# Patient Record
Sex: FEMALE | Race: WHITE | NOT HISPANIC OR LATINO | Employment: OTHER | URBAN - METROPOLITAN AREA
[De-identification: names, ages, dates, MRNs, and addresses within clinical notes are randomized per-mention and may not be internally consistent; named-entity substitution may affect disease eponyms.]

---

## 2017-06-08 ENCOUNTER — OFFICE VISIT (OUTPATIENT)
Dept: ENDOCRINOLOGY | Age: 47
End: 2017-06-08

## 2017-06-08 VITALS
SYSTOLIC BLOOD PRESSURE: 122 MMHG | BODY MASS INDEX: 33.49 KG/M2 | HEIGHT: 68 IN | WEIGHT: 221 LBS | DIASTOLIC BLOOD PRESSURE: 84 MMHG

## 2017-06-08 DIAGNOSIS — R53.82 CHRONIC FATIGUE: ICD-10-CM

## 2017-06-08 DIAGNOSIS — L68.0 HIRSUTISM: ICD-10-CM

## 2017-06-08 DIAGNOSIS — IMO0002 UNCONTROLLED TYPE 2 DIABETES MELLITUS WITH COMPLICATION, WITHOUT LONG-TERM CURRENT USE OF INSULIN: Primary | ICD-10-CM

## 2017-06-08 PROBLEM — G89.29 CHRONIC BACK PAIN: Status: ACTIVE | Noted: 2017-06-08

## 2017-06-08 PROBLEM — M54.9 CHRONIC BACK PAIN: Status: ACTIVE | Noted: 2017-06-08

## 2017-06-08 PROCEDURE — 99204 OFFICE O/P NEW MOD 45 MIN: CPT | Performed by: NURSE PRACTITIONER

## 2017-06-08 RX ORDER — SPIRONOLACTONE 25 MG/1
25 TABLET ORAL DAILY
Qty: 30 TABLET | Refills: 4 | Status: SHIPPED | OUTPATIENT
Start: 2017-06-08 | End: 2017-09-11

## 2017-06-08 RX ORDER — ALBUTEROL SULFATE 90 UG/1
AEROSOL, METERED RESPIRATORY (INHALATION)
COMMUNITY
Start: 2017-02-17

## 2017-06-08 RX ORDER — LISINOPRIL 20 MG/1
TABLET ORAL
Refills: 3 | COMMUNITY
Start: 2017-03-19 | End: 2019-11-15

## 2017-06-08 RX ORDER — FENTANYL 25 UG/H
50 PATCH TRANSDERMAL
COMMUNITY
Start: 2017-05-17 | End: 2019-11-15

## 2017-06-08 RX ORDER — TIZANIDINE HYDROCHLORIDE 4 MG/1
4 CAPSULE, GELATIN COATED ORAL
COMMUNITY
End: 2019-11-15

## 2017-06-08 NOTE — PROGRESS NOTES
Poornima Sims who presents for for evaluation and treatment of Type 2 diabetes mellitus insulin dependent. The initial diagnosis of diabetes was made over the past year.     The condition of diabetes location is system with the course being clinical course has fluctuated , the severity is Mild , and the modifying/allievating factors are  oral medications. Insulin dosage review with Poornima Bethany suggested compliance most of the time   .       Associated symptoms of hyperglycemia have been none.  Associated symptoms of hypoglycemia have been none. Patient reports  hypoglycemia threshold for symptoms is n/a mg/dl .       The patient is currently taking home blood tests - Blood glucose testin times daily, that are:  fasting- 1st thing in morning before eating or drinking     Compliance with blood glucose monitoring: fair. Exercise:intermittently with meal panning: The patient is using no plan.    Home blood glucose testing daily: 1 - FB to 113 mg/dl    Patterns reported per patient are that she had Gestation DM 20 years ago and was on insulin.      The following portions of the patient's history were reviewed and updated as appropriate: current medications, past family history, past medical history, past social history, past surgical history and problem list.        Medical records, chart, and labs reviewed from primary care provider.      Current Outpatient Prescriptions:   •  albuterol (PROAIR HFA) 108 (90 BASE) MCG/ACT inhaler, , Disp: , Rfl:   •  fentaNYL (DURAGESIC) 25 MCG/HR patch, 50 patches., Disp: , Rfl:   •  lisinopril (PRINIVIL,ZESTRIL) 40 MG tablet, TAKE ONE TABLET BY MOUTH EVERY DAY, Disp: , Rfl: 3  •  TiZANidine (ZANAFLEX) 4 MG capsule, Take 4 mg by mouth., Disp: , Rfl:   •  SITagliptin-MetFORMIN HCl ER (JANUMET XR) 100-1000 MG tablet sustained-release 24 hour, Take one by mouth daily, Disp: 30 tablet, Rfl: 4  •  spironolactone (ALDACTONE) 25 MG tablet, Take 1 tablet by mouth Daily., Disp: 30  "tablet, Rfl: 4    Patient Active Problem List    Diagnosis   • Chronic back pain [M54.9, G89.29]   • Uncontrolled type 2 diabetes mellitus with complication, without long-term current use of insulin [E11.8, E11.65]   • Hirsutism [L68.0]   • Chronic fatigue [R53.82]       Review of Systems  A comprehensive review of the 14 systems was negative except of listed below:  Constitutional: fatigue  Eyes: positive for visual disturbance  Hematologic/lymphatic: positive for increase of plt counts and increase A1c  Musculoskeletal:positive for back pain, muscle weakness and muscle cramping  Neurological: positive for weakness  Behavioral/Psych: positive for sleep disturbance  Endocrine: diabetic symptoms including increased fatigue  temperature heat intolerance  hyperglycemia     Objective:     Wt Readings from Last 3 Encounters:   06/08/17 221 lb (100 kg)     Temp Readings from Last 3 Encounters:   No data found for Temp     BP Readings from Last 3 Encounters:   06/08/17 122/84     Pulse Readings from Last 3 Encounters:   No data found for Pulse         /84  Ht 68\" (172.7 cm)  Wt 221 lb (100 kg)  BMI 33.6 kg/m2    General appearance:  alert, cooperative,orientated, , fatigued, nourished\" \"appears stated age and cooperative\" \"NAD   Neck: no carotid bruit, supple, symmetrical, trachea midline and thyroid not enlarged, symmetric, no tenderness/mass/nodules   Thyroid:  no palpable nodule, no tenderness, no enlargement,    Lung:  \"clear to auscultation bilaterally\" \"no abnormal breath sounds  \" effort was normal, no labored breath, no use of accessory muscles.   Heart: regular rate and rhythm, S1, S2 normal, no murmur, click, rub or gallop      Abdomen:  normal bowel sounds- 4 quads, soft non-tender, contour - rounded and contour - obese      Extremities: [extremities normal, atraumatic, no cyanosis or edema\" WNL - gait and station, strength and stability\"          Skin:   Pulses:  warm and dry, no hyperpigmentation, " normal skin coloring, or suspicious lesions, facial hair on chin  2+ and symmetric   Neuro: Alert and oriented x3. Gait normal. Reflexes and motor strength normal and symmetric. Cranial nerves 2-12 and sensation grossly intact.      Psych: behavior - normal, judgement - normal, mood - normal, affect - normal                 Lab Review  May 24, 2017 WBC relatively normal with a high platelet count of 417, HDL 55, triglycerides 127, LDLs 114, total cholesterol 194, glucose 99 GFR 83 hemoglobin A1c 6.8%      Assessment:   Poornima was seen today for diabetes.    Diagnoses and all orders for this visit:    Uncontrolled type 2 diabetes mellitus with complication, without long-term current use of insulin  -     SITagliptin-MetFORMIN HCl ER (JANUMET XR) 100-1000 MG tablet sustained-release 24 hour; Take one by mouth daily  -     Comprehensive Metabolic Panel  -     C-Peptide  -     Hemoglobin A1c  -     Insulin, Total  -     Lipid Panel  -     Microalbumin / Creatinine Urine Ratio  -     TSH  -     Thyroid Antibodies  -     T4, Free  -     T4  -     T3, Free  -     T3  -     Vitamin D 25 Hydroxy    Hirsutism  -     spironolactone (ALDACTONE) 25 MG tablet; Take 1 tablet by mouth Daily.  -     Comprehensive Metabolic Panel    Chronic fatigue  -     Comprehensive Metabolic Panel        Plan:    In Summary: Poornima Sims who presents for for evaluation and treatment of Abnormal glucose testing and labs.  Patient reports that she is had abnormal testing with her blood glucose 4 the last couple of years and has been monitoring this.  She has significant family history.  She herself reports a history of gestational diabetes 20 years ago and was treated with insulin at that time. The clinical course has fluctuated and  the severity is Mild. The modifying/allievating factors are  oral medications that is metformin that was initiated a couple weeks ago..  Medication review review with Poornima Bethany suggested compliance . Patient  reports associated symptoms of hyperglycemia and hypoglycemia have been none.  Patient presently reports that she checks her blood glucose one time daily. Home blood glucose testing daily: 1 - FB to 113 mg/dl. The patient's history were reviewed and updated as appropriate: current medications, past family history, past medical history, past social history, past surgical history and problem list.  Patient has a history abnormal blood glucose, asthma, cervical spine arthritis and surgeries, hyperlipidemia, and hypertension.  Family history is extensive for diabetes on both paternal and maternal side with mother and father being diabetic as well as paternal and maternal aunts.  Lab work was reviewed from primary care provider as well as self-monitoring logs from 2007 through May 24, 2017.  At this time we will obtain a detailed laboratory workup additional and treat as indicated with results.  The medication changes today were as follows:    Stop the metformin    Start Janumet /1000 once daily    Start spironolactone 25 mg once daily in the morning      Education:  interpretation of lab results, blood sugar goals, complications of diabetes mellitus, hypoglycemia prevention and treatment, exercise, illness management, self-monitoring of blood glucose skills and nutrition    Additional instructions:  home blood tests -  Blood glucose testin times daily, that are:  fasting- 1st thing in morning before eating or drinking  before each meal and 1 or 2 hours after meal  bedtime  anytime you feel symptoms of hyperglycemia or hypoglycemia (high or low blood sugars)         Return in about 3 months (around 2017).        Dragon transcription disclaimer     Much of this encounter note is an electronic transcription/translation of spoken language to printed text. The electronic translation of spoken language may permit erroneous, or at times, nonsensical words or phrases to be inadvertently transcribed.  Although I have reviewed the note for such errors, some may still exist.

## 2017-06-08 NOTE — PATIENT INSTRUCTIONS
home blood tests -  Blood glucose testin times daily, that are:  fasting- 1st thing in morning before eating or drinking  before each meal and 1 or 2 hours after meal  bedtime  anytime you feel symptoms of hyperglycemia or hypoglycemia (high or low blood sugars)     Stop the metformin    Start Janumet /1000 once daily    Start spironolactone 25 mg once daily in the morning

## 2017-08-31 LAB
25(OH)D3+25(OH)D2 SERPL-MCNC: 14.9 NG/ML (ref 30–100)
ALBUMIN SERPL-MCNC: 4.8 G/DL (ref 3.5–5.2)
ALBUMIN/CREAT UR: 8.8 MG/G CREAT (ref 0–30)
ALBUMIN/GLOB SERPL: 2.2 G/DL
ALP SERPL-CCNC: 51 U/L (ref 39–117)
ALT SERPL-CCNC: 14 U/L (ref 1–33)
AST SERPL-CCNC: 18 U/L (ref 1–32)
BILIRUB SERPL-MCNC: 0.3 MG/DL (ref 0.1–1.2)
BUN SERPL-MCNC: 9 MG/DL (ref 6–20)
BUN/CREAT SERPL: 10.3 (ref 7–25)
C PEPTIDE SERPL-MCNC: 4.2 NG/ML (ref 1.1–4.4)
CALCIUM SERPL-MCNC: 9.3 MG/DL (ref 8.6–10.5)
CHLORIDE SERPL-SCNC: 100 MMOL/L (ref 98–107)
CHOLEST SERPL-MCNC: 198 MG/DL (ref 0–200)
CO2 SERPL-SCNC: 24.4 MMOL/L (ref 22–29)
CREAT SERPL-MCNC: 0.87 MG/DL (ref 0.57–1)
CREAT UR-MCNC: 405.1 MG/DL
GLOBULIN SER CALC-MCNC: 2.2 GM/DL
GLUCOSE SERPL-MCNC: 111 MG/DL (ref 65–99)
HBA1C MFR BLD: 6.6 % (ref 4.8–5.6)
HDLC SERPL-MCNC: 50 MG/DL (ref 40–60)
INSULIN SERPL-ACNC: 18.3 UIU/ML (ref 2.6–24.9)
LDLC SERPL CALC-MCNC: 126 MG/DL (ref 0–100)
MICROALBUMIN UR-MCNC: 35.7 UG/ML
POTASSIUM SERPL-SCNC: 4.1 MMOL/L (ref 3.5–5.2)
PROT SERPL-MCNC: 7 G/DL (ref 6–8.5)
SODIUM SERPL-SCNC: 140 MMOL/L (ref 136–145)
T3 SERPL-MCNC: 126 NG/DL (ref 80–200)
T3FREE SERPL-MCNC: 3.6 PG/ML (ref 2–4.4)
T4 FREE SERPL-MCNC: 1.08 NG/DL (ref 0.93–1.7)
T4 SERPL-MCNC: 6.86 MCG/DL (ref 4.5–11.7)
THYROGLOB AB SERPL-ACNC: <1 IU/ML (ref 0–0.9)
THYROPEROXIDASE AB SERPL-ACNC: 13 IU/ML (ref 0–34)
TRIGL SERPL-MCNC: 109 MG/DL (ref 0–150)
TSH SERPL DL<=0.005 MIU/L-ACNC: 3.68 MIU/ML (ref 0.27–4.2)
VLDLC SERPL CALC-MCNC: 21.8 MG/DL (ref 5–40)

## 2017-09-11 ENCOUNTER — OFFICE VISIT (OUTPATIENT)
Dept: ENDOCRINOLOGY | Age: 47
End: 2017-09-11

## 2017-09-11 VITALS
DIASTOLIC BLOOD PRESSURE: 82 MMHG | BODY MASS INDEX: 33.49 KG/M2 | HEIGHT: 68 IN | WEIGHT: 221 LBS | SYSTOLIC BLOOD PRESSURE: 134 MMHG

## 2017-09-11 DIAGNOSIS — L68.0 HIRSUTISM: ICD-10-CM

## 2017-09-11 DIAGNOSIS — IMO0002 UNCONTROLLED TYPE 2 DIABETES MELLITUS WITH COMPLICATION, WITHOUT LONG-TERM CURRENT USE OF INSULIN: Primary | ICD-10-CM

## 2017-09-11 DIAGNOSIS — E55.9 VITAMIN D DEFICIENCY: ICD-10-CM

## 2017-09-11 DIAGNOSIS — R53.82 CHRONIC FATIGUE: ICD-10-CM

## 2017-09-11 PROCEDURE — 99214 OFFICE O/P EST MOD 30 MIN: CPT | Performed by: NURSE PRACTITIONER

## 2017-09-11 RX ORDER — ERGOCALCIFEROL 1.25 MG/1
50000 CAPSULE ORAL WEEKLY
Qty: 24 CAPSULE | Refills: 3 | Status: SHIPPED | OUTPATIENT
Start: 2017-09-11 | End: 2017-09-12 | Stop reason: SDUPTHER

## 2017-09-11 RX ORDER — SPIRONOLACTONE 50 MG/1
50 TABLET, FILM COATED ORAL DAILY
Qty: 30 TABLET | Refills: 4 | Status: SHIPPED | OUTPATIENT
Start: 2017-09-11 | End: 2018-01-03 | Stop reason: SDUPTHER

## 2017-09-11 RX ORDER — FENTANYL 50 UG/H
1 PATCH TRANSDERMAL
COMMUNITY
End: 2019-11-15

## 2017-09-11 NOTE — PATIENT INSTRUCTIONS
Blood glucose testing: 3 times daily, that 3:  fasting- 1st thing in morning before eating or drinking  before each meal and 1 or 2 hours after meal  bedtime  anytime you feel symptoms of hyperglycemia or hypoglycemia (high or low blood     Stop the Janumet 100/1000 once daily    Start Janumet 50/1000 twice daily with breakfast and dinner    Start vitamin D 50,000 twice weekly    Increase spironolactone to 50 mg daily can take 2 of the 25 mg until completion of prescription

## 2017-09-11 NOTE — PROGRESS NOTES
Poornima Sims  presents to the office  for the follow-up appointment for Type 2 diabetes mellitus.     The diabete's condition location is throughout the system with the  clinical course has fluctuated, the severity is Mild, and the modifying/allievating factors are oral medications.  Medications and  Dosages were  reviewed with Poornima Sims and suggested that compliance most of the time.    Associated symptoms of hyperglycemia have been none.  Associated symptoms of hypoglycemia have been none. Patient reports  hypoglycemia threshold for symptoms is n/a mg/dl .     The patient is currently on oral medications .     Compliance with blood glucose monitoring: fair.     Meal panning: The patient is using avoidance of concentrated sweets.    The patient is currently taking home blood tests - Blood glucose testin-1 times daily, that are:  fasting- 1st thing in morning before eating or drinking    Last instructions given were:   Stop the metformin     Start Janumet /1000 once daily     Start spironolactone 25 mg once daily in the morning    Home blood glucose testing daily: 0-1  FB to 120 mg/dl -averaging more on the higher end 120- 125 mg/dl  *patient does not test daily*    Last reported blood glucose readings are:   Home blood glucose testing daily: 1 - FB to 113 mg/dl    Patterns reported per patient are that she does not test her BS daily.          The following portions of the patient's history were reviewed and updated as appropriate: current medications, past family history, past medical history, past social history, past surgical history and problem list.      Current Outpatient Prescriptions:   •  albuterol (PROAIR HFA) 108 (90 BASE) MCG/ACT inhaler, , Disp: , Rfl:   •  fentaNYL (DURAGESIC) 12 MCG/HR, Place 1 patch on the skin Every 72 (Seventy-Two) Hours., Disp: , Rfl:   •  fentaNYL (DURAGESIC) 25 MCG/HR patch, 50 patches., Disp: , Rfl:   •  lisinopril (PRINIVIL,ZESTRIL) 40 MG tablet,  "TAKE ONE TABLET BY MOUTH EVERY DAY, Disp: , Rfl: 3  •  TiZANidine (ZANAFLEX) 4 MG capsule, Take 4 mg by mouth., Disp: , Rfl:   •  sitaGLIPtin-metFORMIN (JANUMET)  MG per tablet, Take 1 tablet by mouth 2 (Two) Times a Day With Meals., Disp: 60 tablet, Rfl: 3  •  spironolactone (ALDACTONE) 50 MG tablet, Take 1 tablet by mouth Daily., Disp: 30 tablet, Rfl: 4  •  vitamin D (ERGOCALCIFEROL) 22633 units capsule capsule, Take 1 capsule by mouth 1 (One) Time Per Week. 1 by mouth Twice weekly, Disp: 24 capsule, Rfl: 3    Patient Active Problem List    Diagnosis   • Chronic back pain [M54.9, G89.29]   • Uncontrolled type 2 diabetes mellitus with complication, without long-term current use of insulin [E11.8, E11.65]   • Hirsutism [L68.0]   • Chronic fatigue [R53.82]       Review of Systems   A comprehensive review of the 14 systems was negative except of listed below:  Constitutional: fatigue  Genitourinary:positive for frequency and nocturia  Behavioral/Psych: positive for sleep disturbance  Endocrine: hyperglycemia     Objective:     Wt Readings from Last 3 Encounters:   09/11/17 221 lb (100 kg)   06/08/17 221 lb (100 kg)     Temp Readings from Last 3 Encounters:   No data found for Temp     BP Readings from Last 3 Encounters:   09/11/17 134/82   06/08/17 122/84     Pulse Readings from Last 3 Encounters:   No data found for Pulse        /82  Ht 68\" (172.7 cm)  Wt 221 lb (100 kg)  BMI 33.6 kg/m2    General appearance:  alert, cooperative, delirious, fatigued, nourished\" \"appears stated age and cooperative\" \"NAD   Neck: no carotid bruit, supple, symmetrical, trachea midline and thyroid not enlarged, symmetric, no tenderness/mass/nodules   Thyroid:  no palpable nodule, no enlargement, no tenderness   Lung:  \"clear to auscultation bilaterally\" \"no abnormal breath sounds  \" effort was normal, no labored breath, no use of accessory muscles.   Heart: regular rate and rhythm, S1, S2 normal, no murmur, click, rub or " "gallop      Abdomen:  normal bowel sounds- 4 quads, soft non-tender, contour - rounded and contour - obese      Extremities: extremities normal, atraumatic, no cyanosis or edema extremities normal, atraumatic, no cyanosis or edema\" WNL - gait and station, strength and stability\"       Skin:   Pulses:  warm and dry, no hyperpigmentation, normal skin coloring, or suspicious lesions   2+ and symmetric   Neuro: Alert and oriented x3. Gait normal. Reflexes and motor strength normal and symmetric. Cranial nerves 2-12 and sensation grossly intact.      Psych: behavior - normal, judgement - normal, mood - normal, affect - normal                 Lab Review  Results for orders placed or performed in visit on 06/08/17   Comprehensive Metabolic Panel   Result Value Ref Range    Glucose 111 (H) 65 - 99 mg/dL    BUN 9 6 - 20 mg/dL    Creatinine 0.87 0.57 - 1.00 mg/dL    eGFR Non African Am 69 >60 mL/min/1.73    eGFR African Am 84 >60 mL/min/1.73    BUN/Creatinine Ratio 10.3 7.0 - 25.0    Sodium 140 136 - 145 mmol/L    Potassium 4.1 3.5 - 5.2 mmol/L    Chloride 100 98 - 107 mmol/L    Total CO2 24.4 22.0 - 29.0 mmol/L    Calcium 9.3 8.6 - 10.5 mg/dL    Total Protein 7.0 6.0 - 8.5 g/dL    Albumin 4.80 3.50 - 5.20 g/dL    Globulin 2.2 gm/dL    A/G Ratio 2.2 g/dL    Total Bilirubin 0.3 0.1 - 1.2 mg/dL    Alkaline Phosphatase 51 39 - 117 U/L    AST (SGOT) 18 1 - 32 U/L    ALT (SGPT) 14 1 - 33 U/L   C-Peptide   Result Value Ref Range    C-Peptide 4.2 1.1 - 4.4 ng/mL   Hemoglobin A1c   Result Value Ref Range    Hemoglobin A1C 6.60 (H) 4.80 - 5.60 %   Insulin, Total   Result Value Ref Range    Insulin 18.3 2.6 - 24.9 uIU/mL   Lipid Panel   Result Value Ref Range    Total Cholesterol 198 0 - 200 mg/dL    Triglycerides 109 0 - 150 mg/dL    HDL Cholesterol 50 40 - 60 mg/dL    VLDL Cholesterol 21.8 5 - 40 mg/dL    LDL Cholesterol  126 (H) 0 - 100 mg/dL   TSH   Result Value Ref Range    TSH 3.680 0.270 - 4.200 mIU/mL   Thyroid Antibodies "   Result Value Ref Range    Thyroid Peroxidase Antibody 13 0 - 34 IU/mL    Thyroglobulin Ab <1.0 0.0 - 0.9 IU/mL   T4, Free   Result Value Ref Range    Free T4 1.08 0.93 - 1.70 ng/dL   T4   Result Value Ref Range    T4, Total 6.86 4.50 - 11.70 mcg/dL   T3, Free   Result Value Ref Range    T3, Free 3.6 2.0 - 4.4 pg/mL   T3   Result Value Ref Range    T3, Total 126.0 80.0 - 200.0 ng/dL   Vitamin D 25 Hydroxy   Result Value Ref Range    25 Hydroxy, Vitamin D 14.9 (L) 30.0 - 100.0 ng/mL   Microalbumin / Creatinine Urine Ratio   Result Value Ref Range    Creatinine, Urine 405.1 Not Estab. mg/dL    Microalbumin, Urine 35.7 Not Estab. ug/mL    Microalbumin/Creatinine Ratio Urine 8.8 0.0 - 30.0 mg/g creat           Assessment:   Poornima was seen today for follow-up.    Diagnoses and all orders for this visit:    Uncontrolled type 2 diabetes mellitus with complication, without long-term current use of insulin  -     sitaGLIPtin-metFORMIN (JANUMET)  MG per tablet; Take 1 tablet by mouth 2 (Two) Times a Day With Meals.    Chronic fatigue    Vitamin D deficiency  -     vitamin D (ERGOCALCIFEROL) 12374 units capsule capsule; Take 1 capsule by mouth 1 (One) Time Per Week. 1 by mouth Twice weekly    Hirsutism  -     spironolactone (ALDACTONE) 50 MG tablet; Take 1 tablet by mouth Daily.        Plan:   In summary/instructions with medication changes:     Stop the Janumet 100/1000 once daily    Start Janumet 50/1000 twice daily with breakfast and dinner    Start vitamin D 50,000 twice weekly    Increase spironolactone to 50 mg daily can take 2 of the 25 mg until completion of prescription      Instructions:  Blood glucose testing: 3 times daily, that 3:  fasting- 1st thing in morning before eating or drinking  before each meal and 1 or 2 hours after meal  bedtime  anytime you feel symptoms of hyperglycemia or hypoglycemia (high or low blood       Education:  interpretation of lab results, blood sugar goals, complications of  diabetes mellitus, hypoglycemia prevention and treatment, exercise, illness management, self-monitoring of blood glucose skills, nutrition and carbohydrate counting        Office visit 25 minutes with additional education given 13 minutes - SMBG with goals, medication changes with purposes and s/e profiles.       Return in about 4 months (around 1/11/2018). 4 months with Dr. Harvey - scott 1 week prior, 8 months with Sheeba - 1 week prior for labs      Dragon transcription disclaimer     Much of this encounter note is an electronic transcription/translation of spoken language to printed text. The electronic translation of spoken language may permit erroneous, or at times, nonsensical words or phrases to be inadvertently transcribed. Although I have reviewed the note for such errors, some may still exist.

## 2017-09-12 DIAGNOSIS — E55.9 VITAMIN D DEFICIENCY: ICD-10-CM

## 2017-09-12 RX ORDER — ERGOCALCIFEROL 1.25 MG/1
CAPSULE ORAL
Qty: 24 CAPSULE | Refills: 3 | Status: SHIPPED | OUTPATIENT
Start: 2017-09-12 | End: 2019-01-05 | Stop reason: SDUPTHER

## 2017-10-12 RX ORDER — BLOOD-GLUCOSE METER
1 EACH MISCELLANEOUS ONCE
Qty: 1 KIT | Refills: 0 | Status: SHIPPED | OUTPATIENT
Start: 2017-10-12 | End: 2017-10-12

## 2017-10-12 RX ORDER — LANCETS 33 GAUGE
EACH MISCELLANEOUS
Qty: 300 EACH | Refills: 3 | Status: SHIPPED | OUTPATIENT
Start: 2017-10-12 | End: 2019-07-30 | Stop reason: ALTCHOICE

## 2017-12-30 DIAGNOSIS — IMO0002 UNCONTROLLED TYPE 2 DIABETES MELLITUS WITH COMPLICATION, WITHOUT LONG-TERM CURRENT USE OF INSULIN: ICD-10-CM

## 2018-01-02 RX ORDER — SITAGLIPTIN AND METFORMIN HYDROCHLORIDE 1000; 50 MG/1; MG/1
TABLET, FILM COATED ORAL
Qty: 180 TABLET | Refills: 0 | Status: SHIPPED | OUTPATIENT
Start: 2018-01-02 | End: 2018-12-19 | Stop reason: SDDI

## 2018-01-03 DIAGNOSIS — L68.0 HIRSUTISM: ICD-10-CM

## 2018-01-03 RX ORDER — SPIRONOLACTONE 50 MG/1
TABLET, FILM COATED ORAL
Qty: 120 TABLET | Refills: 0 | Status: SHIPPED | OUTPATIENT
Start: 2018-01-03 | End: 2018-12-19

## 2018-01-11 ENCOUNTER — RESULTS ENCOUNTER (OUTPATIENT)
Dept: ENDOCRINOLOGY | Age: 48
End: 2018-01-11

## 2018-01-11 DIAGNOSIS — IMO0002 UNCONTROLLED TYPE 2 DIABETES MELLITUS WITH COMPLICATION, WITHOUT LONG-TERM CURRENT USE OF INSULIN: ICD-10-CM

## 2018-01-11 DIAGNOSIS — L68.0 HIRSUTISM: ICD-10-CM

## 2018-01-11 DIAGNOSIS — R53.82 CHRONIC FATIGUE: ICD-10-CM

## 2018-01-11 DIAGNOSIS — E55.9 VITAMIN D DEFICIENCY: ICD-10-CM

## 2018-09-18 ENCOUNTER — APPOINTMENT (OUTPATIENT)
Dept: WOMENS IMAGING | Facility: HOSPITAL | Age: 48
End: 2018-09-18

## 2018-09-18 DIAGNOSIS — IMO0002 UNCONTROLLED TYPE 2 DIABETES MELLITUS WITH COMPLICATION, WITHOUT LONG-TERM CURRENT USE OF INSULIN: ICD-10-CM

## 2018-09-18 PROCEDURE — 77067 SCR MAMMO BI INCL CAD: CPT | Performed by: RADIOLOGY

## 2018-09-18 PROCEDURE — 77063 BREAST TOMOSYNTHESIS BI: CPT | Performed by: RADIOLOGY

## 2018-09-19 RX ORDER — SITAGLIPTIN AND METFORMIN HYDROCHLORIDE 1000; 50 MG/1; MG/1
TABLET, FILM COATED ORAL
Qty: 180 TABLET | Refills: 0 | OUTPATIENT
Start: 2018-09-19

## 2018-09-23 DIAGNOSIS — IMO0002 UNCONTROLLED TYPE 2 DIABETES MELLITUS WITH COMPLICATION, WITHOUT LONG-TERM CURRENT USE OF INSULIN: ICD-10-CM

## 2018-09-26 RX ORDER — SITAGLIPTIN AND METFORMIN HYDROCHLORIDE 1000; 50 MG/1; MG/1
TABLET, FILM COATED ORAL
Qty: 180 TABLET | Refills: 0 | OUTPATIENT
Start: 2018-09-26

## 2018-10-26 DIAGNOSIS — L68.0 HIRSUTISM: ICD-10-CM

## 2018-10-26 RX ORDER — SPIRONOLACTONE 25 MG/1
TABLET ORAL
Qty: 30 TABLET | Refills: 3 | OUTPATIENT
Start: 2018-10-26

## 2018-11-02 DIAGNOSIS — E55.9 VITAMIN D DEFICIENCY: ICD-10-CM

## 2018-11-02 RX ORDER — ERGOCALCIFEROL 1.25 MG/1
CAPSULE ORAL
Qty: 88 CAPSULE | Refills: 0 | OUTPATIENT
Start: 2018-11-02

## 2018-12-19 ENCOUNTER — OFFICE VISIT (OUTPATIENT)
Dept: ENDOCRINOLOGY | Age: 48
End: 2018-12-19

## 2018-12-19 VITALS
WEIGHT: 196 LBS | SYSTOLIC BLOOD PRESSURE: 128 MMHG | BODY MASS INDEX: 31.5 KG/M2 | HEIGHT: 66 IN | DIASTOLIC BLOOD PRESSURE: 84 MMHG

## 2018-12-19 DIAGNOSIS — L68.0 HIRSUTISM: ICD-10-CM

## 2018-12-19 DIAGNOSIS — R29.890 HEIGHT LOSS: ICD-10-CM

## 2018-12-19 DIAGNOSIS — IMO0002 UNCONTROLLED TYPE 2 DIABETES MELLITUS WITH COMPLICATION, WITHOUT LONG-TERM CURRENT USE OF INSULIN: Primary | ICD-10-CM

## 2018-12-19 DIAGNOSIS — Z78.0 POST-MENOPAUSAL: ICD-10-CM

## 2018-12-19 PROCEDURE — 99214 OFFICE O/P EST MOD 30 MIN: CPT | Performed by: NURSE PRACTITIONER

## 2018-12-19 RX ORDER — BACLOFEN 10 MG/1
10 TABLET ORAL 3 TIMES DAILY
COMMUNITY
End: 2019-11-15

## 2018-12-19 RX ORDER — SPIRONOLACTONE 100 MG/1
100 TABLET, FILM COATED ORAL DAILY
Qty: 30 TABLET | Refills: 4 | Status: SHIPPED | OUTPATIENT
Start: 2018-12-19 | End: 2019-05-24 | Stop reason: SDUPTHER

## 2018-12-19 NOTE — PROGRESS NOTES
Poornima Sims  presents to the office  for the follow-up appointment for Type 2 diabetes mellitus.     The diabete's condition location is throughout the system with the  clinical course has fluctuated, the severity is Moderate, and the modifying/allievating factors are oral medications.  Medications and  Dosages were  reviewed with Poornima Sims and suggested that noncompliance much of the time.    The patient reports associated symptoms of hyperglycemia have been none and associated symptoms of hypoglycemia have been none, with their  hypoglycemia threshold for symptoms is n/a mg/dl .     The patient is currently on oral medications .      Compliance with blood glucose monitoring: poor.     Meal panning: The patient is using no plan.    The patient is currently taking home blood tests - Blood glucose testing: 3 times daily, that are:  fasting- 1st thing in morning before eating or drinking  before each meal    Last instructions given were:  Stop the Janumet 100/1000 once daily     Start Janumet  twice daily with breakfast and dinner     Start vitamin D 50,000 twice weekly     Increase spironolactone to 50 mg daily can take 2 of the 25 mg until completion of prescription    Home blood glucose testing daily: 3  FB to 130 mg/dl  before lunch 90 to 130 mg/dl  before dinner/supper 90 to 130 mg/dl    Last reported blood glucose readings are:  Home blood glucose testing daily: 0-1  FB to 120 mg/dl -averaging more on the higher end 120- 125 mg/dl  *patient does not test daily*    Patterns reported per patient are none.         The following portions of the patient's history were reviewed and updated as appropriate: current medications, past family history, past medical history, past social history, past surgical history and problem list.      Current Outpatient Medications:   •  albuterol (PROAIR HFA) 108 (90 BASE) MCG/ACT inhaler, , Disp: , Rfl:   •  baclofen (LIORESAL) 10 MG tablet, Take 10 mg by  "mouth 3 (Three) Times a Day., Disp: , Rfl:   •  fentaNYL (DURAGESIC) 25 MCG/HR patch, 50 patches., Disp: , Rfl:   •  fentaNYL (DURAGESIC) 50 MCG/HR patch, Place 1 patch on the skin Every 72 (Seventy-Two) Hours., Disp: , Rfl:   •  glucose blood (ONETOUCH VERIO) test strip, Test 3 times daily, Disp: 300 each, Rfl: 3  •  lisinopril (PRINIVIL,ZESTRIL) 20 MG tablet, TAKE ONE TABLET BY MOUTH EVERY DAY, Disp: , Rfl: 3  •  ONETOUCH DELICA LANCETS 33G misc, Test 3 times daily, Disp: 300 each, Rfl: 3  •  TiZANidine (ZANAFLEX) 4 MG capsule, Take 4 mg by mouth., Disp: , Rfl:   •  vitamin D (ERGOCALCIFEROL) 12035 units capsule capsule, 1 by mouth Twice weekly, Disp: 24 capsule, Rfl: 3  •  spironolactone (ALDACTONE) 100 MG tablet, Take 1 tablet by mouth Daily., Disp: 30 tablet, Rfl: 4    Patient Active Problem List    Diagnosis   • Chronic back pain [M54.9, G89.29]   • Uncontrolled type 2 diabetes mellitus with complication, without long-term current use of insulin (CMS/[facility]) [E11.8, E11.65]   • Hirsutism [L68.0]   • Chronic fatigue [R53.82]       Review of Systems   A comprehensive review of the 14 systems was negative except of listed below:  Genitourinary:positive for frequency and nocturia  Integument/breast: positive for excessive facial hair on chin and lip  Musculoskeletal:positive for back pain, bone pain, neck pain and loss of height, 2\" in over a year.      Objective:     Wt Readings from Last 3 Encounters:   12/19/18 88.9 kg (196 lb)   09/11/17 100 kg (221 lb)   06/08/17 100 kg (221 lb)     Temp Readings from Last 3 Encounters:   No data found for Temp     BP Readings from Last 3 Encounters:   12/19/18 128/84   09/11/17 134/82   06/08/17 122/84     Pulse Readings from Last 3 Encounters:   No data found for Pulse        /84 (BP Location: Left arm, Patient Position: Sitting, Cuff Size: Large Adult)   Ht 167 cm (65.75\")   Wt 88.9 kg (196 lb)   BMI 31.88 kg/m²        Physical Exam   Constitutional: She is oriented " to person, place, and time. She appears well-developed and well-nourished. No distress.   HENT:   Head: Normocephalic and atraumatic.   Eyes: EOM are normal. Pupils are equal, round, and reactive to light.   Neck: Normal range of motion. Neck supple. No thyromegaly present.   Cardiovascular: Normal rate, regular rhythm, normal heart sounds and intact distal pulses.   No murmur heard.  Pulmonary/Chest: Effort normal and breath sounds normal.   Abdominal: Soft. Bowel sounds are normal.   Musculoskeletal: Normal range of motion.   Neurological: She is alert and oriented to person, place, and time.   Skin: Skin is warm and dry. Capillary refill takes 2 to 3 seconds. She is not diaphoretic.   Psychiatric: She has a normal mood and affect. Her behavior is normal. Judgment and thought content normal.   Nursing note and vitals reviewed.          Lab Review  Results for orders placed or performed in visit on 06/08/17   Comprehensive Metabolic Panel   Result Value Ref Range    Glucose 111 (H) 65 - 99 mg/dL    BUN 9 6 - 20 mg/dL    Creatinine 0.87 0.57 - 1.00 mg/dL    eGFR Non African Am 69 >60 mL/min/1.73    eGFR African Am 84 >60 mL/min/1.73    BUN/Creatinine Ratio 10.3 7.0 - 25.0    Sodium 140 136 - 145 mmol/L    Potassium 4.1 3.5 - 5.2 mmol/L    Chloride 100 98 - 107 mmol/L    Total CO2 24.4 22.0 - 29.0 mmol/L    Calcium 9.3 8.6 - 10.5 mg/dL    Total Protein 7.0 6.0 - 8.5 g/dL    Albumin 4.80 3.50 - 5.20 g/dL    Globulin 2.2 gm/dL    A/G Ratio 2.2 g/dL    Total Bilirubin 0.3 0.1 - 1.2 mg/dL    Alkaline Phosphatase 51 39 - 117 U/L    AST (SGOT) 18 1 - 32 U/L    ALT (SGPT) 14 1 - 33 U/L   C-Peptide   Result Value Ref Range    C-Peptide 4.2 1.1 - 4.4 ng/mL   Hemoglobin A1c   Result Value Ref Range    Hemoglobin A1C 6.60 (H) 4.80 - 5.60 %   Insulin, Total   Result Value Ref Range    Insulin 18.3 2.6 - 24.9 uIU/mL   Lipid Panel   Result Value Ref Range    Total Cholesterol 198 0 - 200 mg/dL    Triglycerides 109 0 - 150 mg/dL     HDL Cholesterol 50 40 - 60 mg/dL    VLDL Cholesterol 21.8 5 - 40 mg/dL    LDL Cholesterol  126 (H) 0 - 100 mg/dL   TSH   Result Value Ref Range    TSH 3.680 0.270 - 4.200 mIU/mL   Thyroid Antibodies   Result Value Ref Range    Thyroid Peroxidase Antibody 13 0 - 34 IU/mL    Thyroglobulin Ab <1.0 0.0 - 0.9 IU/mL   T4, Free   Result Value Ref Range    Free T4 1.08 0.93 - 1.70 ng/dL   T4   Result Value Ref Range    T4, Total 6.86 4.50 - 11.70 mcg/dL   T3, Free   Result Value Ref Range    T3, Free 3.6 2.0 - 4.4 pg/mL   T3   Result Value Ref Range    T3, Total 126.0 80.0 - 200.0 ng/dL   Vitamin D 25 Hydroxy   Result Value Ref Range    25 Hydroxy, Vitamin D 14.9 (L) 30.0 - 100.0 ng/mL   Microalbumin / Creatinine Urine Ratio   Result Value Ref Range    Creatinine, Urine 405.1 Not Estab. mg/dL    Microalbumin, Urine 35.7 Not Estab. ug/mL    Microalbumin/Creatinine Ratio Urine 8.8 0.0 - 30.0 mg/g creat           Assessment:   Poornima was seen today for follow-up.    Diagnoses and all orders for this visit:    Uncontrolled type 2 diabetes mellitus with complication, without long-term current use of insulin (CMS/[facility])  -     DEXA Bone Density Axial  -     Comprehensive Metabolic Panel  -     Hemoglobin A1c  -     C-Peptide  -     Insulin, Total  -     Lipid Panel  -     Microalbumin / Creatinine Urine Ratio - Urine, Clean Catch  -     Uric Acid  -     Vitamin D 25 Hydroxy  -     TSH  -     T4  -     T3  -     Calcitonin  -     Calcium  -     Calcium, Ionized  -     PTH, Intact  -     Fructosamine; Future  -     Comprehensive Metabolic Panel; Future  -     C-Peptide; Future  -     Hemoglobin A1c; Future  -     Lipid Panel; Future  -     Microalbumin / Creatinine Urine Ratio - Urine, Clean Catch; Future  -     Uric Acid; Future  -     Vitamin D 25 Hydroxy; Future  -     TSH; Future  -     T4; Future  -     T3; Future    Height loss  -     DEXA Bone Density Axial  -     Comprehensive Metabolic Panel  -     Vitamin D 25 Hydroxy  -      Calcitonin  -     Calcium  -     Calcium, Ionized  -     PTH, Intact  -     Comprehensive Metabolic Panel; Future    Post-menopausal  -     DEXA Bone Density Axial  -     Comprehensive Metabolic Panel  -     Vitamin D 25 Hydroxy  -     Comprehensive Metabolic Panel; Future    Hirsutism  -     spironolactone (ALDACTONE) 100 MG tablet; Take 1 tablet by mouth Daily.  -     Comprehensive Metabolic Panel; Future          Plan:   In summary/ Medication changes: I met with this patient is metabolically stable and doing well at this time.  Laboratory testing was reviewed dated 9-2018 (GYN office- not Endo labs), discussed with questions and answers completed.  Discussed and formulate a treatment plan with patient, patient verbally stated understood all instructions.     Uncontrolled type 2 diabetes mellitus with complication, without long-term current use of insulin (CMS/[facility])-chronic, stable.  Gestational diabetes history.  Patient has not been on medications for some time.  States that she is controlling it per her nutrition.  Patient reports today since last visit of 9-2017.  There were no updated labs obtained prior to the visit.  At this time we will obtain lab work and treat as indicated with results.  -      Height loss- patient reports that she has lost 2 inches of height in the last 6-7 months.  She states that she has informed her primary care and her orthopedic surgeon- Yamilet stated that this is not their area of workup per patient's account.  At this time we will obtain a DEXA scan as well as lab work and treat as indicated with results.  -       Post-menopausal- patient was ordered estrogen/progesterone per her GYN.  Patient refused at that time.  Her lab work obtained in September per her GYN she is menopausal.  Education was given on the importance of mono replacement inconsideration with her bone loss.  Patient states that she is not willing to take any medication -   for this at this time.     Hirsutism -  uncontrolled, no change.  Will increase by lactone 100 mg daily.        note: Patient reported today with her disability papers 1 in this provider to complete.  This provider instructed her that no paperwork will be completed at this time.  She was a new patient to this provider in 2017 she followed up one time in 2017 and has not followed up since.  She stated that she has been on disability since May 2017 and that this provider was listed on there as diagnosing her with diabetes.  I instructed her that she was referred him here as it uncontrolled diabetic and that this provider did not diagnose her.  In addition she has prior insulin resistance with gestational diabetes.  She then asked this provider if after this appointment and lab work what I complete the paperwork.  I stated that from the Endo component to it at this point she isn't was not disability candidate.  And that she will have to be seen in the office to 3 times before any paperwork will be completed       Additional instructions   home blood tests -  Blood glucose testin times daily, that are:  fasting- 1st thing in morning before eating or drinking  before each meal and 1 or 2 hours after meal  bedtime  anytime you feel symptoms of hyperglycemia or hypoglycemia (high or low blood sugars)        Education:  interpretation of lab results, blood sugar goals, complications of diabetes mellitus, hypoglycemia prevention and treatment, exercise, illness management, self-monitoring of blood glucose skills, nutrition, carbohydrate counting and site rotation        The total face to face time spent was  25 minutes  with additional education given: 14 minutes (greater than 50% of the total time) was spent with counseling and coordination of care on: SMBG with goals, Side effects profiles with medications, medication use and purposes,     Return in about 4 months (around 2019). Follow-up in 4 months with She[facility]-2 weeks prior for labs 8  months with Dr. Harvey-2 weeks prior for labs        Dragon transcription disclaimer     Much of this encounter note is an electronic transcription/translation of spoken language to printed text. The electronic translation of spoken language may permit erroneous, or at times, nonsensical words or phrases to be inadvertently transcribed. Although I have reviewed the note for such errors, some may still exist.

## 2018-12-21 LAB
25(OH)D3+25(OH)D2 SERPL-MCNC: 38.8 NG/ML (ref 30–100)
ALBUMIN SERPL-MCNC: 5.3 G/DL (ref 3.5–5.2)
ALBUMIN/CREAT UR: 13.9 MG/G CREAT (ref 0–30)
ALBUMIN/GLOB SERPL: 2 G/DL
ALP SERPL-CCNC: 65 U/L (ref 39–117)
ALT SERPL-CCNC: 16 U/L (ref 1–33)
AST SERPL-CCNC: 17 U/L (ref 1–32)
BILIRUB SERPL-MCNC: 0.6 MG/DL (ref 0.1–1.2)
BUN SERPL-MCNC: 21 MG/DL (ref 6–20)
BUN/CREAT SERPL: 21.6 (ref 7–25)
C PEPTIDE SERPL-MCNC: 2.3 NG/ML (ref 1.1–4.4)
CA-I SERPL ISE-MCNC: 5.5 MG/DL (ref 4.5–5.6)
CALCIT SERPL-MCNC: <2 PG/ML (ref 0–5)
CALCIUM SERPL-MCNC: 10.7 MG/DL (ref 8.6–10.5)
CHLORIDE SERPL-SCNC: 96 MMOL/L (ref 98–107)
CHOLEST SERPL-MCNC: 252 MG/DL (ref 0–200)
CO2 SERPL-SCNC: 22.6 MMOL/L (ref 22–29)
CREAT SERPL-MCNC: 0.97 MG/DL (ref 0.57–1)
CREAT UR-MCNC: 56.8 MG/DL
GLOBULIN SER CALC-MCNC: 2.7 GM/DL
GLUCOSE SERPL-MCNC: 97 MG/DL (ref 65–99)
HBA1C MFR BLD: 5.9 % (ref 4.8–5.6)
HDLC SERPL-MCNC: 59 MG/DL (ref 40–60)
INSULIN SERPL-ACNC: 5.2 UIU/ML (ref 2.6–24.9)
INTERPRETATION: NORMAL
LDLC SERPL CALC-MCNC: 176 MG/DL (ref 0–100)
Lab: NORMAL
MICROALBUMIN UR-MCNC: 7.9 UG/ML
POTASSIUM SERPL-SCNC: 4.7 MMOL/L (ref 3.5–5.2)
PROT SERPL-MCNC: 8 G/DL (ref 6–8.5)
PTH-INTACT SERPL-MCNC: 31 PG/ML (ref 15–65)
SODIUM SERPL-SCNC: 134 MMOL/L (ref 136–145)
T3 SERPL-MCNC: 95.7 NG/DL (ref 80–200)
T4 SERPL-MCNC: 9.19 MCG/DL (ref 4.5–11.7)
TRIGL SERPL-MCNC: 85 MG/DL (ref 0–150)
TSH SERPL DL<=0.005 MIU/L-ACNC: 1.71 MIU/ML (ref 0.27–4.2)
URATE SERPL-MCNC: 8 MG/DL (ref 2.4–5.7)
VLDLC SERPL CALC-MCNC: 17 MG/DL (ref 5–40)

## 2019-01-02 ENCOUNTER — APPOINTMENT (OUTPATIENT)
Dept: BONE DENSITY | Facility: HOSPITAL | Age: 49
End: 2019-01-02

## 2019-01-05 DIAGNOSIS — E55.9 VITAMIN D DEFICIENCY: ICD-10-CM

## 2019-01-07 ENCOUNTER — HOSPITAL ENCOUNTER (OUTPATIENT)
Dept: BONE DENSITY | Facility: HOSPITAL | Age: 49
Discharge: HOME | End: 2019-01-07
Admitting: NURSE PRACTITIONER

## 2019-01-07 PROCEDURE — 77080 DXA BONE DENSITY AXIAL: CPT

## 2019-01-07 RX ORDER — ERGOCALCIFEROL 1.25 MG/1
CAPSULE ORAL
Qty: 24 CAPSULE | Refills: 3 | Status: SHIPPED | OUTPATIENT
Start: 2019-01-07 | End: 2019-04-02 | Stop reason: SDUPTHER

## 2019-03-19 ENCOUNTER — RESULTS ENCOUNTER (OUTPATIENT)
Dept: ENDOCRINOLOGY | Age: 49
End: 2019-03-19

## 2019-03-19 DIAGNOSIS — R29.890 HEIGHT LOSS: ICD-10-CM

## 2019-03-19 DIAGNOSIS — Z78.0 POST-MENOPAUSAL: ICD-10-CM

## 2019-03-19 DIAGNOSIS — L68.0 HIRSUTISM: ICD-10-CM

## 2019-03-19 DIAGNOSIS — IMO0002 UNCONTROLLED TYPE 2 DIABETES MELLITUS WITH COMPLICATION, WITHOUT LONG-TERM CURRENT USE OF INSULIN: ICD-10-CM

## 2019-03-21 LAB
25(OH)D3+25(OH)D2 SERPL-MCNC: 29.2 NG/ML (ref 30–100)
ALBUMIN SERPL-MCNC: 4.9 G/DL (ref 3.5–5.2)
ALBUMIN/CREAT UR: <5.7 MG/G CREAT (ref 0–30)
ALBUMIN/GLOB SERPL: 1.9 G/DL
ALP SERPL-CCNC: 77 U/L (ref 39–117)
ALT SERPL-CCNC: 20 U/L (ref 1–33)
AST SERPL-CCNC: 17 U/L (ref 1–32)
BILIRUB SERPL-MCNC: 0.5 MG/DL (ref 0.2–1.2)
BUN SERPL-MCNC: 10 MG/DL (ref 6–20)
BUN/CREAT SERPL: 11.6 (ref 7–25)
C PEPTIDE SERPL-MCNC: 3.8 NG/ML (ref 1.1–4.4)
CALCIUM SERPL-MCNC: 10.6 MG/DL (ref 8.6–10.5)
CHLORIDE SERPL-SCNC: 99 MMOL/L (ref 98–107)
CHOLEST SERPL-MCNC: 197 MG/DL (ref 0–200)
CO2 SERPL-SCNC: 25.8 MMOL/L (ref 22–29)
CREAT SERPL-MCNC: 0.86 MG/DL (ref 0.57–1)
CREAT UR-MCNC: 52.9 MG/DL
FRUCTOSAMINE SERPL-SCNC: 282 UMOL/L (ref 0–285)
GLOBULIN SER CALC-MCNC: 2.6 GM/DL
GLUCOSE SERPL-MCNC: 121 MG/DL (ref 65–99)
HBA1C MFR BLD: 6.6 % (ref 4.8–5.6)
HDLC SERPL-MCNC: 42 MG/DL (ref 40–60)
INTERPRETATION: NORMAL
LDLC SERPL CALC-MCNC: 126 MG/DL (ref 0–100)
Lab: NORMAL
MICROALBUMIN UR-MCNC: <3 UG/ML
POTASSIUM SERPL-SCNC: 5 MMOL/L (ref 3.5–5.2)
PROT SERPL-MCNC: 7.5 G/DL (ref 6–8.5)
SODIUM SERPL-SCNC: 139 MMOL/L (ref 136–145)
T3 SERPL-MCNC: 157 NG/DL (ref 80–200)
T4 SERPL-MCNC: 7.76 MCG/DL (ref 4.5–11.7)
TRIGL SERPL-MCNC: 145 MG/DL (ref 0–150)
TSH SERPL DL<=0.005 MIU/L-ACNC: 1.23 MIU/ML (ref 0.27–4.2)
URATE SERPL-MCNC: 6.5 MG/DL (ref 2.4–5.7)
VLDLC SERPL CALC-MCNC: 29 MG/DL (ref 5–40)

## 2019-04-02 ENCOUNTER — OFFICE VISIT (OUTPATIENT)
Dept: ENDOCRINOLOGY | Age: 49
End: 2019-04-02

## 2019-04-02 VITALS
DIASTOLIC BLOOD PRESSURE: 76 MMHG | SYSTOLIC BLOOD PRESSURE: 122 MMHG | BODY MASS INDEX: 32.14 KG/M2 | WEIGHT: 200 LBS | HEIGHT: 66 IN

## 2019-04-02 DIAGNOSIS — E55.9 VITAMIN D DEFICIENCY: ICD-10-CM

## 2019-04-02 DIAGNOSIS — IMO0002 UNCONTROLLED TYPE 2 DIABETES MELLITUS WITH COMPLICATION, WITHOUT LONG-TERM CURRENT USE OF INSULIN: Primary | ICD-10-CM

## 2019-04-02 DIAGNOSIS — E83.52 HYPERCALCEMIA: ICD-10-CM

## 2019-04-02 PROCEDURE — 99214 OFFICE O/P EST MOD 30 MIN: CPT | Performed by: NURSE PRACTITIONER

## 2019-04-02 RX ORDER — ERGOCALCIFEROL 1.25 MG/1
CAPSULE ORAL
Qty: 7 CAPSULE | Refills: 0 | Status: SHIPPED | OUTPATIENT
Start: 2019-04-02 | End: 2019-11-15

## 2019-04-02 NOTE — PROGRESS NOTES
Poornima Sims  presents to the office  for the follow-up appointment for Type 2 diabetes mellitus.     The diabete's condition location is throughout the system with the  clinical course has fluctuated, the severity is Mild, and the modifying/allievating factors are oral medications.  Medications and  Dosages were  reviewed with Poornima Sims and suggested that compliance most of the time.    The patient reports associated symptoms of hyperglycemia have been none and associated symptoms of hypoglycemia have been none, with their  hypoglycemia threshold for symptoms is n/a mg/dl .     The patient is currently on oral medications .      Compliance with blood glucose monitoring: fair.     Meal panning: The patient is using avoidance of concentrated sweets.    The patient is currently taking home blood tests - Blood glucose testin times daily, that are:    Last instructions given were:   Uncontrolled type 2 diabetes mellitus with complication, without long-term current use of insulin (CMS/[facility])-chronic, stable.  Gestational diabetes history.  Patient has not been on medications for some time.  States that she is controlling it per her nutrition.  Patient reports today since last visit of .  There were no updated labs obtained prior to the visit.  At this time we will obtain lab work and treat as indicated with results.  -             Height loss- patient reports that she has lost 2 inches of height in the last 6-7 months.  She states that she has informed her primary care and her orthopedic surgeon- Yamilet stated that this is not their area of workup per patient's account.  At this time we will obtain a DEXA scan as well as lab work and treat as indicated with results.  -                  Post-menopausal- patient was ordered estrogen/progesterone per her GYN.  Patient refused at that time.  Her lab work obtained in September per her GYN she is menopausal.  Education was given on the importance of mono  replacement inconsideration with her bone loss.  Patient states that she is not willing to take any medication -   for this at this time.                 Hirsutism - uncontrolled, no change.  Will increase by lactone 100 mg daily.     Home blood glucose testing daily: 0    Last reported blood glucose readings are:  Last reported blood glucose readings are:  Home blood glucose testing daily: 0-1  FB to 120 mg/dl -averaging more on the higher end 120- 125 mg/dl  *patient does not test daily*    Patterns reported per patient are none.         The following portions of the patient's history were reviewed and updated as appropriate: current medications, past family history, past medical history, past social history, past surgical history and problem list.      Current Outpatient Medications:   •  albuterol (PROAIR HFA) 108 (90 BASE) MCG/ACT inhaler, , Disp: , Rfl:   •  baclofen (LIORESAL) 10 MG tablet, Take 10 mg by mouth 3 (Three) Times a Day., Disp: , Rfl:   •  fentaNYL (DURAGESIC) 25 MCG/HR patch, 50 patches., Disp: , Rfl:   •  fentaNYL (DURAGESIC) 50 MCG/HR patch, Place 1 patch on the skin Every 72 (Seventy-Two) Hours., Disp: , Rfl:   •  glucose blood (ONETOUCH VERIO) test strip, Test 3 times daily, Disp: 300 each, Rfl: 3  •  lisinopril (PRINIVIL,ZESTRIL) 20 MG tablet, TAKE ONE TABLET BY MOUTH EVERY DAY, Disp: , Rfl: 3  •  ONETOUCH DELICA LANCETS 33G misc, Test 3 times daily, Disp: 300 each, Rfl: 3  •  spironolactone (ALDACTONE) 100 MG tablet, Take 1 tablet by mouth Daily., Disp: 30 tablet, Rfl: 4  •  TiZANidine (ZANAFLEX) 4 MG capsule, Take 4 mg by mouth., Disp: , Rfl:   •  vitamin D (ERGOCALCIFEROL) 96636 units capsule capsule, 1 daily for 7 days only then change to 10,000 daily, Disp: 7 capsule, Rfl: 0  •  cholecalciferol (VITAMIN D3) 50832 units capsule, Take 1 capsule by mouth Daily., Disp: 30 capsule, Rfl: 6    Patient Active Problem List    Diagnosis   • Chronic back pain [M54.9, G89.29]   • Uncontrolled  "type 2 diabetes mellitus with complication, without long-term current use of insulin (CMS/[facility]) [E11.8, E11.65]   • Hirsutism [L68.0]   • Chronic fatigue [R53.82]       Review of Systems   A comprehensive review of the 14 systems was negative except of listed below:  Musculoskeletal:loss of height     Objective:     Wt Readings from Last 3 Encounters:   04/02/19 90.7 kg (200 lb)   12/19/18 88.9 kg (196 lb)   09/11/17 100 kg (221 lb)     Temp Readings from Last 3 Encounters:   No data found for Temp     BP Readings from Last 3 Encounters:   04/02/19 122/76   12/19/18 128/84   09/11/17 134/82     Pulse Readings from Last 3 Encounters:   No data found for Pulse        /76   Ht 166.4 cm (65.5\")   Wt 90.7 kg (200 lb)   BMI 32.78 kg/m²        Physical Exam   Constitutional: She is oriented to person, place, and time. She appears well-developed and well-nourished. No distress.   HENT:   Head: Normocephalic and atraumatic.   Eyes: EOM are normal. Pupils are equal, round, and reactive to light.   Neck: Normal range of motion. Neck supple. No thyromegaly present.   Cardiovascular: Normal rate, regular rhythm, normal heart sounds and intact distal pulses.   No murmur heard.  Pulmonary/Chest: Effort normal and breath sounds normal.   Abdominal: Soft. Bowel sounds are normal.   Musculoskeletal: Normal range of motion.   Neurological: She is alert and oriented to person, place, and time.   Skin: Skin is warm and dry. Capillary refill takes 2 to 3 seconds. She is not diaphoretic.   Psychiatric: She has a normal mood and affect. Her behavior is normal. Judgment and thought content normal.   Nursing note and vitals reviewed.          Lab Review  Results for orders placed or performed in visit on 03/19/19   Fructosamine   Result Value Ref Range    Fructosamine 282 0 - 285 umol/L   Comprehensive Metabolic Panel   Result Value Ref Range    Glucose 121 (H) 65 - 99 mg/dL    BUN 10 6 - 20 mg/dL    Creatinine 0.86 0.57 - 1.00 mg/dL "    eGFR Non African Am 70 >60 mL/min/1.73    eGFR African Am 85 >60 mL/min/1.73    BUN/Creatinine Ratio 11.6 7.0 - 25.0    Sodium 139 136 - 145 mmol/L    Potassium 5.0 3.5 - 5.2 mmol/L    Chloride 99 98 - 107 mmol/L    Total CO2 25.8 22.0 - 29.0 mmol/L    Calcium 10.6 (H) 8.6 - 10.5 mg/dL    Total Protein 7.5 6.0 - 8.5 g/dL    Albumin 4.90 3.50 - 5.20 g/dL    Globulin 2.6 gm/dL    A/G Ratio 1.9 g/dL    Total Bilirubin 0.5 0.2 - 1.2 mg/dL    Alkaline Phosphatase 77 39 - 117 U/L    AST (SGOT) 17 1 - 32 U/L    ALT (SGPT) 20 1 - 33 U/L   C-Peptide   Result Value Ref Range    C-Peptide 3.8 1.1 - 4.4 ng/mL   Hemoglobin A1c   Result Value Ref Range    Hemoglobin A1C 6.60 (H) 4.80 - 5.60 %   Lipid Panel   Result Value Ref Range    Total Cholesterol 197 0 - 200 mg/dL    Triglycerides 145 0 - 150 mg/dL    HDL Cholesterol 42 40 - 60 mg/dL    VLDL Cholesterol 29 5 - 40 mg/dL    LDL Cholesterol  126 (H) 0 - 100 mg/dL   Microalbumin / Creatinine Urine Ratio - Urine, Clean Catch   Result Value Ref Range    Creatinine, Urine 52.9 Not Estab. mg/dL    Microalbumin, Urine <3.0 Not Estab. ug/mL    Microalbumin/Creatinine Ratio <5.7 0.0 - 30.0 mg/g creat   Uric Acid   Result Value Ref Range    Uric Acid 6.5 (H) 2.4 - 5.7 mg/dL   Vitamin D 25 Hydroxy   Result Value Ref Range    25 Hydroxy, Vitamin D 29.2 (L) 30.0 - 100.0 ng/ml   TSH   Result Value Ref Range    TSH 1.230 0.270 - 4.200 mIU/mL   T4   Result Value Ref Range    T4, Total 7.76 4.50 - 11.70 mcg/dL   T3   Result Value Ref Range    T3, Total 157.0 80.0 - 200.0 ng/dl   Cardiovascular Risk Assessment   Result Value Ref Range    Interpretation Note    Diabetes Patient Education   Result Value Ref Range    PDF Image Not applicable            Assessment:   Poornima was seen today for follow-up.    Diagnoses and all orders for this visit:    Uncontrolled type 2 diabetes mellitus with complication, without long-term current use of insulin (CMS/[facility])    Vitamin D deficiency  -      cholecalciferol (VITAMIN D3) 49430 units capsule; Take 1 capsule by mouth Daily.  -     vitamin D (ERGOCALCIFEROL) 65177 units capsule capsule; 1 daily for 7 days only then change to 10,000 daily    Hypercalcemia  -     Calcium, Ionized  -     Calcium  -     PTH, Intact  -     Phosphorus          Plan:   In summary/ Medication changes: I met with this patient is metabolically stable and doing well at this time.  Laboratory testing was reviewed dated 3.19.2019, discussed with questions and answers completed.  Discussed and formulate a treatment plan with patient, patient verbally stated understood all instructions.        Uncontrolled type 2 diabetes mellitus with complication, without long-term current use of insulin - chronic, uncontrolled.  No medication changes at this time due to patient   not wanting to take medications- feels like it is her diet. This visit was resulted from [place].      Vitamin D deficiency- chronic, uncontrolled.  Medication changes were as follows   Vitamin D 50,000 units 1 by mouth for 7 days then to change to vitamin D 10,000 units daily.    Hypercalcemia - chronic, uncontrolled.  Medication changes were as follows- further workup will be completed.        Education:  interpretation of lab results, blood sugar goals, complications of diabetes mellitus, hypoglycemia prevention and treatment, exercise, illness management, self-monitoring of blood glucose skills, nutrition, carbohydrate counting and site rotation        The total face to face time spent was  25 minutes  with additional education given: 14 minutes (greater than 50% of the total time) was spent with counseling and coordination of care on: SMBG with goals, Side effects profiles with medications, medication use and purposes,     Return in about 6 months (around 10/2/2019), or if symptoms worsen or fail to improve, for Recheck.with Dr. Harvey - 2 weeks prior for labs      Dragon transcription disclaimer     Much of this encounter  note is an electronic transcription/translation of spoken language to printed text. The electronic translation of spoken language may permit erroneous, or at times, nonsensical words or phrases to be inadvertently transcribed. Although I have reviewed the note for such errors, some may still exist.

## 2019-04-03 LAB
CA-I SERPL ISE-MCNC: 5.2 MG/DL (ref 4.5–5.6)
CALCIUM SERPL-MCNC: 10.1 MG/DL (ref 8.6–10.5)
PHOSPHATE SERPL-MCNC: 2.9 MG/DL (ref 2.5–4.5)
PTH-INTACT SERPL-MCNC: 43 PG/ML (ref 15–65)

## 2019-05-24 DIAGNOSIS — L68.0 HIRSUTISM: ICD-10-CM

## 2019-05-24 RX ORDER — SPIRONOLACTONE 100 MG/1
TABLET, FILM COATED ORAL
Qty: 30 TABLET | Refills: 0 | Status: SHIPPED | OUTPATIENT
Start: 2019-05-24 | End: 2019-06-19 | Stop reason: SDUPTHER

## 2019-06-19 DIAGNOSIS — L68.0 HIRSUTISM: ICD-10-CM

## 2019-06-20 RX ORDER — SPIRONOLACTONE 100 MG/1
TABLET, FILM COATED ORAL
Qty: 30 TABLET | Refills: 0 | Status: SHIPPED | OUTPATIENT
Start: 2019-06-20 | End: 2019-07-28 | Stop reason: SDUPTHER

## 2019-07-28 DIAGNOSIS — L68.0 HIRSUTISM: ICD-10-CM

## 2019-07-29 RX ORDER — SPIRONOLACTONE 100 MG/1
TABLET, FILM COATED ORAL
Qty: 30 TABLET | Refills: 0 | Status: SHIPPED | OUTPATIENT
Start: 2019-07-29 | End: 2019-08-26 | Stop reason: SDUPTHER

## 2019-07-30 RX ORDER — LANCETS
EACH MISCELLANEOUS
Qty: 306 EACH | Refills: 0 | Status: SHIPPED | OUTPATIENT
Start: 2019-07-30 | End: 2020-05-15 | Stop reason: ALTCHOICE

## 2019-07-30 RX ORDER — BLOOD-GLUCOSE METER
1 EACH MISCELLANEOUS ONCE
Qty: 1 KIT | Refills: 0 | Status: SHIPPED | OUTPATIENT
Start: 2019-07-30 | End: 2019-07-30

## 2019-08-26 DIAGNOSIS — L68.0 HIRSUTISM: ICD-10-CM

## 2019-08-26 DIAGNOSIS — IMO0002 UNCONTROLLED TYPE 2 DIABETES MELLITUS WITH COMPLICATION, WITHOUT LONG-TERM CURRENT USE OF INSULIN: ICD-10-CM

## 2019-08-26 RX ORDER — SITAGLIPTIN AND METFORMIN HYDROCHLORIDE 1000; 50 MG/1; MG/1
TABLET, FILM COATED ORAL
Qty: 180 TABLET | Refills: 0 | OUTPATIENT
Start: 2019-08-26

## 2019-08-26 RX ORDER — SPIRONOLACTONE 100 MG/1
TABLET, FILM COATED ORAL
Qty: 30 TABLET | Refills: 0 | Status: SHIPPED | OUTPATIENT
Start: 2019-08-26 | End: 2019-10-10 | Stop reason: SDUPTHER

## 2019-10-02 RX ORDER — SITAGLIPTIN AND METFORMIN HYDROCHLORIDE 1000; 50 MG/1; MG/1
TABLET, FILM COATED ORAL
Qty: 60 TABLET | Refills: 1 | Status: SHIPPED | OUTPATIENT
Start: 2019-10-02 | End: 2019-11-15

## 2019-10-10 DIAGNOSIS — IMO0002 UNCONTROLLED TYPE 2 DIABETES MELLITUS WITH COMPLICATION, WITHOUT LONG-TERM CURRENT USE OF INSULIN: Primary | ICD-10-CM

## 2019-10-10 DIAGNOSIS — L68.0 HIRSUTISM: ICD-10-CM

## 2019-10-10 DIAGNOSIS — R53.82 CHRONIC FATIGUE: ICD-10-CM

## 2019-10-10 RX ORDER — SPIRONOLACTONE 100 MG/1
TABLET, FILM COATED ORAL
Qty: 30 TABLET | Refills: 0 | Status: SHIPPED | OUTPATIENT
Start: 2019-10-10 | End: 2019-11-15

## 2019-10-28 RX ORDER — BLOOD SUGAR DIAGNOSTIC
STRIP MISCELLANEOUS
Qty: 300 EACH | Refills: 0 | Status: SHIPPED | OUTPATIENT
Start: 2019-10-28 | End: 2019-11-15

## 2019-10-29 RX ORDER — BLOOD SUGAR DIAGNOSTIC
STRIP MISCELLANEOUS
Qty: 300 EACH | Refills: 0 | Status: SHIPPED | OUTPATIENT
Start: 2019-10-29 | End: 2020-05-15 | Stop reason: ALTCHOICE

## 2019-11-01 ENCOUNTER — LAB (OUTPATIENT)
Dept: ENDOCRINOLOGY | Age: 49
End: 2019-11-01

## 2019-11-01 DIAGNOSIS — IMO0002 UNCONTROLLED TYPE 2 DIABETES MELLITUS WITH COMPLICATION, WITHOUT LONG-TERM CURRENT USE OF INSULIN: ICD-10-CM

## 2019-11-01 DIAGNOSIS — R53.82 CHRONIC FATIGUE: ICD-10-CM

## 2019-11-01 DIAGNOSIS — L68.0 HIRSUTISM: ICD-10-CM

## 2019-11-02 LAB
25(OH)D3+25(OH)D2 SERPL-MCNC: 57.3 NG/ML (ref 30–100)
ALBUMIN SERPL-MCNC: 4.9 G/DL (ref 3.5–5.2)
ALBUMIN/GLOB SERPL: 2.2 G/DL
ALP SERPL-CCNC: 62 U/L (ref 39–117)
ALT SERPL-CCNC: 18 U/L (ref 1–33)
AST SERPL-CCNC: 12 U/L (ref 1–32)
BILIRUB SERPL-MCNC: 0.4 MG/DL (ref 0.2–1.2)
BUN SERPL-MCNC: 11 MG/DL (ref 6–20)
BUN/CREAT SERPL: 12.5 (ref 7–25)
C PEPTIDE SERPL-MCNC: 4.4 NG/ML (ref 1.1–4.4)
CALCIUM SERPL-MCNC: 9.7 MG/DL (ref 8.6–10.5)
CHLORIDE SERPL-SCNC: 96 MMOL/L (ref 98–107)
CHOLEST SERPL-MCNC: 220 MG/DL (ref 0–200)
CO2 SERPL-SCNC: 26.6 MMOL/L (ref 22–29)
CREAT SERPL-MCNC: 0.88 MG/DL (ref 0.57–1)
GLOBULIN SER CALC-MCNC: 2.2 GM/DL
GLUCOSE SERPL-MCNC: 127 MG/DL (ref 65–99)
HBA1C MFR BLD: 6.6 % (ref 4.8–5.6)
HDLC SERPL-MCNC: 53 MG/DL (ref 40–60)
INTERPRETATION: NORMAL
LDLC SERPL CALC-MCNC: 148 MG/DL (ref 0–100)
Lab: NORMAL
MICROALBUMIN UR-MCNC: <3 UG/ML
POTASSIUM SERPL-SCNC: 4.5 MMOL/L (ref 3.5–5.2)
PROT SERPL-MCNC: 7.1 G/DL (ref 6–8.5)
SODIUM SERPL-SCNC: 135 MMOL/L (ref 136–145)
T4 SERPL-MCNC: 7.17 MCG/DL (ref 4.5–11.7)
TRIGL SERPL-MCNC: 94 MG/DL (ref 0–150)
TSH SERPL DL<=0.005 MIU/L-ACNC: 3.07 UIU/ML (ref 0.27–4.2)
URATE SERPL-MCNC: 4.9 MG/DL (ref 2.4–5.7)
VLDLC SERPL CALC-MCNC: 18.8 MG/DL (ref 5–40)

## 2019-11-09 DIAGNOSIS — L68.0 HIRSUTISM: ICD-10-CM

## 2019-11-12 RX ORDER — SITAGLIPTIN AND METFORMIN HYDROCHLORIDE 1000; 50 MG/1; MG/1
TABLET, FILM COATED ORAL
Qty: 60 TABLET | Refills: 1 | OUTPATIENT
Start: 2019-11-12

## 2019-11-12 RX ORDER — SPIRONOLACTONE 100 MG/1
TABLET, FILM COATED ORAL
Qty: 30 TABLET | Refills: 0 | OUTPATIENT
Start: 2019-11-12

## 2019-11-15 ENCOUNTER — OFFICE VISIT (OUTPATIENT)
Dept: ENDOCRINOLOGY | Age: 49
End: 2019-11-15

## 2019-11-15 VITALS — WEIGHT: 213.2 LBS | HEIGHT: 66 IN | BODY MASS INDEX: 34.27 KG/M2

## 2019-11-15 DIAGNOSIS — E55.9 VITAMIN D DEFICIENCY: ICD-10-CM

## 2019-11-15 DIAGNOSIS — IMO0002 UNCONTROLLED TYPE 2 DIABETES MELLITUS WITH COMPLICATION, WITHOUT LONG-TERM CURRENT USE OF INSULIN: Primary | ICD-10-CM

## 2019-11-15 DIAGNOSIS — R53.82 CHRONIC FATIGUE: ICD-10-CM

## 2019-11-15 DIAGNOSIS — I10 ESSENTIAL HYPERTENSION: ICD-10-CM

## 2019-11-15 DIAGNOSIS — L68.0 HIRSUTISM: ICD-10-CM

## 2019-11-15 DIAGNOSIS — E66.9 CLASS 1 OBESITY WITHOUT SERIOUS COMORBIDITY WITH BODY MASS INDEX (BMI) OF 34.0 TO 34.9 IN ADULT, UNSPECIFIED OBESITY TYPE: ICD-10-CM

## 2019-11-15 DIAGNOSIS — E78.5 DYSLIPIDEMIA: ICD-10-CM

## 2019-11-15 PROCEDURE — 99215 OFFICE O/P EST HI 40 MIN: CPT | Performed by: INTERNAL MEDICINE

## 2019-11-15 RX ORDER — TIZANIDINE 4 MG/1
TABLET ORAL
Refills: 0 | COMMUNITY
Start: 2019-09-10

## 2019-11-15 RX ORDER — EMPAGLIFLOZIN 25 MG/1
1 TABLET, FILM COATED ORAL
Qty: 90 TABLET | Refills: 3 | Status: SHIPPED | OUTPATIENT
Start: 2019-11-15

## 2019-11-15 RX ORDER — DULAGLUTIDE 1.5 MG/.5ML
1.5 INJECTION, SOLUTION SUBCUTANEOUS WEEKLY
Qty: 12 PEN | Refills: 3 | Status: SHIPPED | OUTPATIENT
Start: 2019-11-15

## 2019-11-15 RX ORDER — SPIRONOLACTONE 100 MG/1
100 TABLET, FILM COATED ORAL 2 TIMES DAILY
Qty: 180 TABLET | Refills: 3 | Status: SHIPPED | OUTPATIENT
Start: 2019-11-15 | End: 2020-11-14

## 2019-11-15 RX ORDER — LISINOPRIL 40 MG/1
TABLET ORAL
COMMUNITY
Start: 2019-11-07 | End: 2019-11-15

## 2019-11-15 RX ORDER — BUDESONIDE AND FORMOTEROL FUMARATE DIHYDRATE 160; 4.5 UG/1; UG/1
AEROSOL RESPIRATORY (INHALATION)
COMMUNITY
Start: 2019-11-07

## 2019-11-15 RX ORDER — ROSUVASTATIN CALCIUM 20 MG/1
20 TABLET, COATED ORAL DAILY
Qty: 90 TABLET | Refills: 3 | Status: SHIPPED | OUTPATIENT
Start: 2019-11-15 | End: 2020-05-15 | Stop reason: SDDI

## 2019-11-15 NOTE — PROGRESS NOTES
"Subjective   Poornima Sims is a 49 y.o. female Seen for follow up for DM 2, lab review. Patient is checking BG 3x daily.  Patient states having excessive hair growth on chin.     Ht 166.4 cm (65.5\")   Wt 96.7 kg (213 lb 3.2 oz)   BMI 34.94 kg/m²     Allergies   Allergen Reactions   • Keflex [Cephalexin] Other (See Comments)     Mouth blisters   • Penicillins Unknown (See Comments)   • Eggs Or Egg-Derived Products Nausea And Vomiting     Can eat eggs but not by themselfs         Current Outpatient Medications:   •  ACCU-CHEK FASTCLIX LANCETS misc, Test 3 times daily DX:e11.65, Disp: 306 each, Rfl: 0  •  ACCU-CHEK GUIDE test strip, TEST BLOOD SUGAR 3 TIMES A DAY, Disp: 300 each, Rfl: 0  •  albuterol (PROAIR HFA) 108 (90 BASE) MCG/ACT inhaler, , Disp: , Rfl:   •  cholecalciferol (VITAMIN D3) 42937 units capsule, Take 1 capsule by mouth Daily., Disp: 30 capsule, Rfl: 6  •  lisinopril (PRINIVIL,ZESTRIL) 40 MG tablet, , Disp: , Rfl:   •  spironolactone (ALDACTONE) 100 MG tablet, TAKE 1 TABLET BY MOUTH ONCE A DAY, Disp: 30 tablet, Rfl: 0  •  SYMBICORT 160-4.5 MCG/ACT inhaler, , Disp: , Rfl:   •  tiZANidine (ZANAFLEX) 4 MG tablet, TAKE BY MOUTH 1/2 TAB IN THE MORNING 1/2 TAB AT NOON AND 2 TABS AT NIGHT, Disp: , Rfl: 0        History of Present Illness this is a 49-year-old female known patient with type 2 diabetes and hirsutism as well as dyslipidemia and vitamin D deficiency with morbid obesity.  Over the course of last 6 months she has had no significant health problem for which to go to the ER or hospital.    The following portions of the patient's history were reviewed and updated as appropriate: allergies, current medications, past family history, past medical history, past social history, past surgical history and problem list.    Review of Systems   Constitutional: Negative.    HENT: Negative.    Eyes: Negative.    Respiratory: Negative.    Cardiovascular: Negative.    Gastrointestinal: Negative.    Endocrine: " Negative.    Genitourinary: Negative.    Musculoskeletal: Negative.    Skin: Negative.    Allergic/Immunologic: Negative.    Neurological: Negative.    Hematological: Negative.    Psychiatric/Behavioral: Negative.    The above review of system was reviewed, corroborated and accepted.    Objective      Results for orders placed or performed in visit on 11/01/19   T4 & TSH ([facility])   Result Value Ref Range    TSH 3.070 0.270 - 4.200 uIU/mL    T4, Total 7.17 4.50 - 11.70 mcg/dL   Vitamin D 25 Hydroxy   Result Value Ref Range    25 Hydroxy, Vitamin D 57.3 30.0 - 100.0 ng/ml   Uric Acid   Result Value Ref Range    Uric Acid 4.9 2.4 - 5.7 mg/dL   MicroAlbumin, Urine, Random - Urine, Clean Catch   Result Value Ref Range    Microalbumin, Urine <3.0 Not Estab. ug/mL   Lipid Panel   Result Value Ref Range    Total Cholesterol 220 (H) 0 - 200 mg/dL    Triglycerides 94 0 - 150 mg/dL    HDL Cholesterol 53 40 - 60 mg/dL    VLDL Cholesterol 18.8 5 - 40 mg/dL    LDL Cholesterol  148 (H) 0 - 100 mg/dL   Hemoglobin A1c   Result Value Ref Range    Hemoglobin A1C 6.60 (H) 4.80 - 5.60 %   C-Peptide   Result Value Ref Range    C-Peptide 4.4 1.1 - 4.4 ng/mL   Comprehensive Metabolic Panel   Result Value Ref Range    Glucose 127 (H) 65 - 99 mg/dL    BUN 11 6 - 20 mg/dL    Creatinine 0.88 0.57 - 1.00 mg/dL    eGFR Non African Am 68 >60 mL/min/1.73    eGFR African Am 82 >60 mL/min/1.73    BUN/Creatinine Ratio 12.5 7.0 - 25.0    Sodium 135 (L) 136 - 145 mmol/L    Potassium 4.5 3.5 - 5.2 mmol/L    Chloride 96 (L) 98 - 107 mmol/L    Total CO2 26.6 22.0 - 29.0 mmol/L    Calcium 9.7 8.6 - 10.5 mg/dL    Total Protein 7.1 6.0 - 8.5 g/dL    Albumin 4.90 3.50 - 5.20 g/dL    Globulin 2.2 gm/dL    A/G Ratio 2.2 g/dL    Total Bilirubin 0.4 0.2 - 1.2 mg/dL    Alkaline Phosphatase 62 39 - 117 U/L    AST (SGOT) 12 1 - 32 U/L    ALT (SGPT) 18 1 - 33 U/L   Cardiovascular Risk Assessment   Result Value Ref Range    Interpretation Note    Diabetes Patient  Education   Result Value Ref Range    PDF Image Not applicable        Physical Exam   Constitutional: She is oriented to person, place, and time. She appears well-developed and well-nourished. No distress.   HENT:   Head: Normocephalic and atraumatic.   Right Ear: External ear normal.   Left Ear: External ear normal.   Nose: Nose normal.   Mouth/Throat: Oropharynx is clear and moist. No oropharyngeal exudate.   Eyes: Conjunctivae and EOM are normal. Pupils are equal, round, and reactive to light. Right eye exhibits no discharge. Left eye exhibits no discharge. No scleral icterus.   Neck: Normal range of motion. Neck supple. No JVD present. No tracheal deviation present. No thyromegaly present.   Cardiovascular: Normal rate, regular rhythm, normal heart sounds and intact distal pulses. Exam reveals no gallop and no friction rub.   No murmur heard.  Pulmonary/Chest: Effort normal and breath sounds normal. No stridor. No respiratory distress. She has no wheezes. She has no rales. She exhibits no tenderness.   Abdominal: Soft. Bowel sounds are normal. She exhibits no distension and no mass. There is no tenderness. There is no rebound and no guarding. No hernia.   Musculoskeletal: Normal range of motion. She exhibits no edema, tenderness or deformity.   Lymphadenopathy:     She has no cervical adenopathy.   Neurological: She is alert and oriented to person, place, and time. She displays normal reflexes. No cranial nerve deficit or sensory deficit. She exhibits normal muscle tone. Coordination normal.   Skin: Skin is warm and dry. No rash noted. She is not diaphoretic. No erythema. No pallor.   Psychiatric: She has a normal mood and affect. Her behavior is normal. Judgment and thought content normal.   Nursing note and vitals reviewed.        Assessment/Plan   Diagnoses and all orders for this visit:    Uncontrolled type 2 diabetes mellitus with complication, without long-term current use of insulin (CMS/[facility])  -     T4 &  TSH ([facility]); Future  -     Uric Acid; Future  -     Vitamin D 25 Hydroxy; Future  -     Comprehensive Metabolic Panel; Future  -     C-Peptide; Future  -     Follicle Stimulating Hormone; Future  -     Hemoglobin A1c; Future  -     Luteinizing Hormone; Future  -     MicroAlbumin, Urine, Random - Urine, Clean Catch; Future  -     NMR LipoProfile; Future    Hirsutism  -     T4 & TSH ([facility]); Future  -     Uric Acid; Future  -     Vitamin D 25 Hydroxy; Future  -     Comprehensive Metabolic Panel; Future  -     C-Peptide; Future  -     Follicle Stimulating Hormone; Future  -     Hemoglobin A1c; Future  -     Luteinizing Hormone; Future  -     MicroAlbumin, Urine, Random - Urine, Clean Catch; Future  -     NMR LipoProfile; Future    Chronic fatigue  -     T4 & TSH ([facility]); Future  -     Uric Acid; Future  -     Vitamin D 25 Hydroxy; Future  -     Comprehensive Metabolic Panel; Future  -     C-Peptide; Future  -     Follicle Stimulating Hormone; Future  -     Hemoglobin A1c; Future  -     Luteinizing Hormone; Future  -     MicroAlbumin, Urine, Random - Urine, Clean Catch; Future  -     NMR LipoProfile; Future    Class 1 obesity without serious comorbidity with body mass index (BMI) of 34.0 to 34.9 in adult, unspecified obesity type  -     T4 & TSH ([facility]); Future  -     Uric Acid; Future  -     Vitamin D 25 Hydroxy; Future  -     Comprehensive Metabolic Panel; Future  -     C-Peptide; Future  -     Follicle Stimulating Hormone; Future  -     Hemoglobin A1c; Future  -     Luteinizing Hormone; Future  -     MicroAlbumin, Urine, Random - Urine, Clean Catch; Future  -     NMR LipoProfile; Future    Dyslipidemia  -     T4 & TSH ([facility]); Future  -     Uric Acid; Future  -     Vitamin D 25 Hydroxy; Future  -     Comprehensive Metabolic Panel; Future  -     C-Peptide; Future  -     Follicle Stimulating Hormone; Future  -     Hemoglobin A1c; Future  -     Luteinizing Hormone; Future  -     MicroAlbumin, Urine, Random -  Urine, Clean Catch; Future  -     NMR LipoProfile; Future    Essential hypertension  -     T4 & TSH ([facility]); Future  -     Uric Acid; Future  -     Vitamin D 25 Hydroxy; Future  -     Comprehensive Metabolic Panel; Future  -     C-Peptide; Future  -     Follicle Stimulating Hormone; Future  -     Hemoglobin A1c; Future  -     Luteinizing Hormone; Future  -     MicroAlbumin, Urine, Random - Urine, Clean Catch; Future  -     NMR LipoProfile; Future    Vitamin D deficiency  -     cholecalciferol (VITAMIN D3) 250 MCG (60504 UT) capsule; Take 1 capsule by mouth Daily.    Other orders  -     TRULICITY 1.5 MG/0.5ML solution pen-injector; Inject 1.5 mg under the skin into the appropriate area as directed 1 (One) Time Per Week.  -     JARDIANCE 25 MG tablet; Take 25 mg by mouth Daily With Breakfast.  -     rosuvastatin (CRESTOR) 20 MG tablet; Take 1 tablet by mouth Daily.  -     spironolactone (ALDACTONE) 100 MG tablet; Take 1 tablet by mouth 2 (Two) Times a Day.      In summary I saw and examined this 49-year-old female for above-mentioned problems.  I reviewed her laboratory evaluations of 11/1/2019 and provided her with a hard copy of it.  Aside from elevated levels of total cholesterol and LDL she is otherwise clinically and metabolically stable.  So because of her being unable to tolerate any medication with metformin I am starting her on Jardiance 10 mg every morning for 4 weeks and if no problem 25 mg every morning.  I warned her about the importance of making sure she remains well-hydrated and be very mindful of her genital area hygiene after use of toilet or sexual intercourse.  I also gave her Trulicity 0.75 mg once weekly and after 4 weeks to go up to 1.5 mg weekly.  Additionally I am increasing the dose of her spironolactone to 100 mg twice a day and to stop her lisinopril and also prescribed Crestor 20 mg daily for her dyslipidemia and a very strong family history of cardiovascular disease.  This office visit  lasted 40 minutes of which 25 minutes was a spent on face-to-face counseling and education as well as planning her future care moving forward.  She will see Ms. Sheeba Greer in 6 months or sooner if needed with laboratory evaluation prior to each office visit.

## 2020-05-01 ENCOUNTER — RESULTS ENCOUNTER (OUTPATIENT)
Dept: ENDOCRINOLOGY | Age: 50
End: 2020-05-01

## 2020-05-01 DIAGNOSIS — IMO0002 UNCONTROLLED TYPE 2 DIABETES MELLITUS WITH COMPLICATION, WITHOUT LONG-TERM CURRENT USE OF INSULIN: ICD-10-CM

## 2020-05-01 DIAGNOSIS — E78.5 DYSLIPIDEMIA: ICD-10-CM

## 2020-05-01 DIAGNOSIS — R53.82 CHRONIC FATIGUE: ICD-10-CM

## 2020-05-01 DIAGNOSIS — I10 ESSENTIAL HYPERTENSION: ICD-10-CM

## 2020-05-01 DIAGNOSIS — E66.9 CLASS 1 OBESITY WITHOUT SERIOUS COMORBIDITY WITH BODY MASS INDEX (BMI) OF 34.0 TO 34.9 IN ADULT, UNSPECIFIED OBESITY TYPE: ICD-10-CM

## 2020-05-01 DIAGNOSIS — L68.0 HIRSUTISM: ICD-10-CM

## 2020-05-05 LAB
25(OH)D3+25(OH)D2 SERPL-MCNC: 72.7 NG/ML (ref 30–100)
ALBUMIN SERPL-MCNC: 4.7 G/DL (ref 3.5–5.2)
ALBUMIN/GLOB SERPL: 1.8 G/DL
ALP SERPL-CCNC: 72 U/L (ref 39–117)
ALT SERPL-CCNC: 21 U/L (ref 1–33)
AST SERPL-CCNC: 17 U/L (ref 1–32)
BILIRUB SERPL-MCNC: 0.5 MG/DL (ref 0.2–1.2)
BUN SERPL-MCNC: 14 MG/DL (ref 6–20)
BUN/CREAT SERPL: 18.7 (ref 7–25)
C PEPTIDE SERPL-MCNC: 4.5 NG/ML (ref 1.1–4.4)
CALCIUM SERPL-MCNC: 10.2 MG/DL (ref 8.6–10.5)
CHLORIDE SERPL-SCNC: 97 MMOL/L (ref 98–107)
CHOLEST SERPL-MCNC: 212 MG/DL (ref 100–199)
CO2 SERPL-SCNC: 27 MMOL/L (ref 22–29)
CREAT SERPL-MCNC: 0.75 MG/DL (ref 0.57–1)
FSH SERPL-ACNC: 50.4 MIU/ML
GLOBULIN SER CALC-MCNC: 2.6 GM/DL
GLUCOSE SERPL-MCNC: 137 MG/DL (ref 65–99)
HBA1C MFR BLD: 6.7 % (ref 4.8–5.6)
HDL SERPL-SCNC: 36.2 UMOL/L
HDLC SERPL-MCNC: 52 MG/DL
LDL SERPL QN: 20.6 NM
LDL SERPL-SCNC: 1895 NMOL/L
LDL SMALL SERPL-SCNC: 1003 NMOL/L
LDLC SERPL CALC-MCNC: 140 MG/DL (ref 0–99)
LH SERPL-ACNC: 20.5 MIU/ML
MICROALBUMIN UR-MCNC: 3.6 UG/ML
POTASSIUM SERPL-SCNC: 4.3 MMOL/L (ref 3.5–5.2)
PROT SERPL-MCNC: 7.3 G/DL (ref 6–8.5)
SODIUM SERPL-SCNC: 138 MMOL/L (ref 136–145)
T4 SERPL-MCNC: 6.94 MCG/DL (ref 4.5–11.7)
TRIGL SERPL-MCNC: 102 MG/DL (ref 0–149)
TSH SERPL DL<=0.005 MIU/L-ACNC: 1.78 UIU/ML (ref 0.27–4.2)
URATE SERPL-MCNC: 5.8 MG/DL (ref 2.4–5.7)

## 2020-05-15 ENCOUNTER — OFFICE VISIT (OUTPATIENT)
Dept: ENDOCRINOLOGY | Age: 50
End: 2020-05-15

## 2020-05-15 DIAGNOSIS — E78.5 DYSLIPIDEMIA: ICD-10-CM

## 2020-05-15 DIAGNOSIS — R53.82 CHRONIC FATIGUE: ICD-10-CM

## 2020-05-15 DIAGNOSIS — I10 ESSENTIAL HYPERTENSION: ICD-10-CM

## 2020-05-15 DIAGNOSIS — IMO0002 UNCONTROLLED TYPE 2 DIABETES MELLITUS WITH COMPLICATION, WITHOUT LONG-TERM CURRENT USE OF INSULIN: Primary | ICD-10-CM

## 2020-05-15 DIAGNOSIS — E66.9 CLASS 1 OBESITY WITHOUT SERIOUS COMORBIDITY WITH BODY MASS INDEX (BMI) OF 34.0 TO 34.9 IN ADULT, UNSPECIFIED OBESITY TYPE: ICD-10-CM

## 2020-05-15 PROCEDURE — 99214 OFFICE O/P EST MOD 30 MIN: CPT | Performed by: INTERNAL MEDICINE

## 2020-05-15 RX ORDER — CHLORAL HYDRATE 500 MG
CAPSULE ORAL
COMMUNITY

## 2020-05-15 RX ORDER — LISINOPRIL 20 MG/1
20 TABLET ORAL DAILY
COMMUNITY

## 2020-05-15 RX ORDER — TURMERIC 400 MG
CAPSULE ORAL
COMMUNITY

## 2020-05-15 NOTE — PROGRESS NOTES
Subjective   Poornima Sims is a 49 y.o. female seen for follow up for DM2, hyperlipidemia, HTN, lab review. Patient is checking BG 3 times a day. She is requesting a new meter and supplies. She denies any problems or concerns.     History of Present Illness this is a 49-year-old female known patient with type 2 diabetes hypertension and dyslipidemia as well as vitamin D deficiency.  Over the course of last 6 months she has had no significant health problem for which to go to the ER or hospital.  She has not taken Crestor altogether while she also tells me that on the father's side cardiovascular problem and heart attack and stroke runs rampant.  Nevertheless she is concerned about statin class causing her some health problems down the road.    Allergies   Allergen Reactions   • Keflex [Cephalexin] Other (See Comments)     Mouth blisters   • Penicillins Unknown (See Comments)   • Eggs Or Egg-Derived Products Nausea And Vomiting     Can eat eggs but not by themselfs       Current Outpatient Medications:   •  albuterol (PROAIR HFA) 108 (90 BASE) MCG/ACT inhaler, , Disp: , Rfl:   •  cholecalciferol (VITAMIN D3) 250 MCG (32686 UT) capsule, Take 1 capsule by mouth Daily., Disp: 90 capsule, Rfl: 3  •  JARDIANCE 25 MG tablet, Take 25 mg by mouth Daily With Breakfast., Disp: 90 tablet, Rfl: 3  •  lisinopril (PRINIVIL,ZESTRIL) 20 MG tablet, Take 20 mg by mouth Daily., Disp: , Rfl:   •  Multiple Vitamins-Minerals (MULTIVITAMIN ADULT PO), Take  by mouth., Disp: , Rfl:   •  Omega-3 Fatty Acids (FISH OIL) 1000 MG capsule capsule, Take  by mouth Daily With Breakfast., Disp: , Rfl:   •  spironolactone (ALDACTONE) 100 MG tablet, Take 1 tablet by mouth 2 (Two) Times a Day., Disp: 180 tablet, Rfl: 3  •  SYMBICORT 160-4.5 MCG/ACT inhaler, , Disp: , Rfl:   •  tiZANidine (ZANAFLEX) 4 MG tablet, TAKE BY MOUTH 1/2 TAB IN THE MORNING 1/2 TAB AT NOON AND 2 TABS AT NIGHT, Disp: , Rfl: 0  •  TRULICITY 1.5 MG/0.5ML solution pen-injector,  Inject 1.5 mg under the skin into the appropriate area as directed 1 (One) Time Per Week., Disp: 12 pen, Rfl: 3  •  Turmeric 400 MG capsule, Take  by mouth., Disp: , Rfl:   The above list of medications were reviewed and reconciled as well as accepted.  The following portions of the patient's history were reviewed and updated as appropriate: allergies, current medications, past family history, past medical history, past social history, past surgical history and problem list.    Review of Systems   Constitutional: Negative.    HENT: Negative.    Eyes: Negative.    Respiratory: Negative.    Cardiovascular: Negative.    Gastrointestinal: Negative.    Endocrine: Negative.    Genitourinary: Negative.    Musculoskeletal: Negative.    Skin: Negative.    Allergic/Immunologic: Negative.    Neurological: Negative.    Hematological: Negative.    Psychiatric/Behavioral: Negative.      The above review of system was reviewed, corroborated and accepted.  Objective   Physical Exam   As this was a phone encounter no opportunity for physical exam.  She was however alert and oriented and did not seem to be in any form of distress.  She was articulate and coherent in her conversation.  Results for orders placed or performed in visit on 05/01/20   T4 & TSH ([facility])   Result Value Ref Range    TSH 1.780 0.270 - 4.200 uIU/mL    T4, Total 6.94 4.50 - 11.70 mcg/dL   Uric Acid   Result Value Ref Range    Uric Acid 5.8 (H) 2.4 - 5.7 mg/dL   Vitamin D 25 Hydroxy   Result Value Ref Range    25 Hydroxy, Vitamin D 72.7 30.0 - 100.0 ng/ml   Comprehensive Metabolic Panel   Result Value Ref Range    Glucose 137 (H) 65 - 99 mg/dL    BUN 14 6 - 20 mg/dL    Creatinine 0.75 0.57 - 1.00 mg/dL    eGFR Non African Am 81 >60 mL/min/1.73    eGFR African Am 99 >60 mL/min/1.73    BUN/Creatinine Ratio 18.7 7.0 - 25.0    Sodium 138 136 - 145 mmol/L    Potassium 4.3 3.5 - 5.2 mmol/L    Chloride 97 (L) 98 - 107 mmol/L    Total CO2 27.0 22.0 - 29.0 mmol/L     Calcium 10.2 8.6 - 10.5 mg/dL    Total Protein 7.3 6.0 - 8.5 g/dL    Albumin 4.70 3.50 - 5.20 g/dL    Globulin 2.6 gm/dL    A/G Ratio 1.8 g/dL    Total Bilirubin 0.5 0.2 - 1.2 mg/dL    Alkaline Phosphatase 72 39 - 117 U/L    AST (SGOT) 17 1 - 32 U/L    ALT (SGPT) 21 1 - 33 U/L   C-Peptide   Result Value Ref Range    C-Peptide 4.5 (H) 1.1 - 4.4 ng/mL   Follicle Stimulating Hormone   Result Value Ref Range    FSH 50.4 mIU/mL   Hemoglobin A1c   Result Value Ref Range    Hemoglobin A1C 6.70 (H) 4.80 - 5.60 %   Luteinizing Hormone   Result Value Ref Range    LH 20.5 mIU/mL   MicroAlbumin, Urine, Random - Urine, Clean Catch   Result Value Ref Range    Microalbumin, Urine 3.6 Not Estab. ug/mL   NMR LipoProfile   Result Value Ref Range    LDL-P 1,895 (H) <1,000 nmol/L    LDL-C 140 (H) 0 - 99 mg/dL    HDL-C 52 >39 mg/dL    Triglycerides 102 0 - 149 mg/dL    Total Cholesterol 212 (H) 100 - 199 mg/dL    HDL-P (Total) 36.2 >=30.5 umol/L    Small LDL-P 1,003 (H) <=527 nmol/L    LDL Size 20.6 >20.5 nm         Assessment/Plan   Poornima was seen today for diabetes.    Diagnoses and all orders for this visit:    Uncontrolled type 2 diabetes mellitus with complication, without long-term current use of insulin (CMS/[facility])  -     T4 & TSH ([facility]); Future  -     T3, Free; Future  -     T4, Free; Future  -     Thyroglobulin With Anti-TG; Future  -     Uric Acid; Future  -     Vitamin D 25 Hydroxy; Future  -     Comprehensive Metabolic Panel; Future  -     C-Peptide; Future  -     Hemoglobin A1c; Future  -     MicroAlbumin, Urine, Random - Urine, Clean Catch; Future  -     NMR LipoProfile; Future    Essential hypertension  -     T4 & TSH ([facility]); Future  -     T3, Free; Future  -     T4, Free; Future  -     Thyroglobulin With Anti-TG; Future  -     Uric Acid; Future  -     Vitamin D 25 Hydroxy; Future  -     Comprehensive Metabolic Panel; Future  -     C-Peptide; Future  -     Hemoglobin A1c; Future  -     MicroAlbumin, Urine, Random -  Urine, Clean Catch; Future  -     NMR LipoProfile; Future    Class 1 obesity without serious comorbidity with body mass index (BMI) of 34.0 to 34.9 in adult, unspecified obesity type  -     T4 & TSH ([facility]); Future  -     T3, Free; Future  -     T4, Free; Future  -     Thyroglobulin With Anti-TG; Future  -     Uric Acid; Future  -     Vitamin D 25 Hydroxy; Future  -     Comprehensive Metabolic Panel; Future  -     C-Peptide; Future  -     Hemoglobin A1c; Future  -     MicroAlbumin, Urine, Random - Urine, Clean Catch; Future  -     NMR LipoProfile; Future    Chronic fatigue  -     T4 & TSH ([facility]); Future  -     T3, Free; Future  -     T4, Free; Future  -     Thyroglobulin With Anti-TG; Future  -     Uric Acid; Future  -     Vitamin D 25 Hydroxy; Future  -     Comprehensive Metabolic Panel; Future  -     C-Peptide; Future  -     Hemoglobin A1c; Future  -     MicroAlbumin, Urine, Random - Urine, Clean Catch; Future  -     NMR LipoProfile; Future    Dyslipidemia  -     T4 & TSH ([facility]); Future  -     T3, Free; Future  -     T4, Free; Future  -     Thyroglobulin With Anti-TG; Future  -     Uric Acid; Future  -     Vitamin D 25 Hydroxy; Future  -     Comprehensive Metabolic Panel; Future  -     C-Peptide; Future  -     Hemoglobin A1c; Future  -     MicroAlbumin, Urine, Random - Urine, Clean Catch; Future  -     NMR LipoProfile; Future        In summary I had a telephone encounter with this 49-year-old female for above-mentioned problems.  I reviewed her laboratory evaluations of 5/4/2020 and provided her with a hard copy of it.  Overall she is clinically and metabolically stable however her LDL particles as well as the small dense LDL both are unacceptably high however she insisted that she will do her best to bring it down and asked for 6 months to do it I also pointed out to her that part of her problem can be corrected by modifying diet however nonmodifiable causes such as family history is very difficult to  overcome.  I will see her in 6 months or sooner if needed with laboratory evaluation prior to each office visit.  This is office visit with going over her history and medications and lab work and spending a great deal of time explaining her to her that diseases state were dyslipidemia lasted 25 minutes.  Minutes.

## 2020-11-04 ENCOUNTER — RESULTS ENCOUNTER (OUTPATIENT)
Dept: ENDOCRINOLOGY | Age: 50
End: 2020-11-04

## 2020-11-04 DIAGNOSIS — IMO0002 UNCONTROLLED TYPE 2 DIABETES MELLITUS WITH COMPLICATION, WITHOUT LONG-TERM CURRENT USE OF INSULIN: ICD-10-CM

## 2020-11-04 DIAGNOSIS — R53.82 CHRONIC FATIGUE: ICD-10-CM

## 2020-11-04 DIAGNOSIS — E66.9 CLASS 1 OBESITY WITHOUT SERIOUS COMORBIDITY WITH BODY MASS INDEX (BMI) OF 34.0 TO 34.9 IN ADULT, UNSPECIFIED OBESITY TYPE: ICD-10-CM

## 2020-11-04 DIAGNOSIS — I10 ESSENTIAL HYPERTENSION: ICD-10-CM

## 2020-11-04 DIAGNOSIS — E78.5 DYSLIPIDEMIA: ICD-10-CM
